# Patient Record
Sex: MALE | Race: WHITE | Employment: FULL TIME | URBAN - METROPOLITAN AREA
[De-identification: names, ages, dates, MRNs, and addresses within clinical notes are randomized per-mention and may not be internally consistent; named-entity substitution may affect disease eponyms.]

---

## 2020-12-10 ENCOUNTER — APPOINTMENT (OUTPATIENT)
Dept: GENERAL RADIOLOGY | Age: 60
End: 2020-12-10
Payer: OTHER GOVERNMENT

## 2020-12-10 ENCOUNTER — APPOINTMENT (OUTPATIENT)
Dept: CT IMAGING | Age: 60
End: 2020-12-10
Payer: OTHER GOVERNMENT

## 2020-12-10 ENCOUNTER — HOSPITAL ENCOUNTER (EMERGENCY)
Age: 60
Discharge: HOME | End: 2020-12-10
Attending: EMERGENCY MEDICINE
Payer: OTHER GOVERNMENT

## 2020-12-10 VITALS
TEMPERATURE: 98.6 F | OXYGEN SATURATION: 96 % | HEART RATE: 91 BPM | RESPIRATION RATE: 20 BRPM | WEIGHT: 188 LBS | SYSTOLIC BLOOD PRESSURE: 107 MMHG | DIASTOLIC BLOOD PRESSURE: 67 MMHG

## 2020-12-10 LAB
ALBUMIN SERPL-MCNC: 4.3 G/DL (ref 3.5–4.6)
ALP BLD-CCNC: 63 U/L (ref 35–104)
ALT SERPL-CCNC: 34 U/L (ref 0–41)
ANION GAP SERPL CALCULATED.3IONS-SCNC: 9 MEQ/L (ref 9–15)
AST SERPL-CCNC: 37 U/L (ref 0–40)
BASOPHILS ABSOLUTE: 0 K/UL (ref 0–0.2)
BASOPHILS RELATIVE PERCENT: 0.5 %
BILIRUB SERPL-MCNC: 1.2 MG/DL (ref 0.2–0.7)
BUN BLDV-MCNC: 15 MG/DL (ref 6–20)
CALCIUM SERPL-MCNC: 9.6 MG/DL (ref 8.5–9.9)
CHLORIDE BLD-SCNC: 97 MEQ/L (ref 95–107)
CO2: 30 MEQ/L (ref 20–31)
CREAT SERPL-MCNC: 0.72 MG/DL (ref 0.7–1.2)
EOSINOPHILS ABSOLUTE: 0.1 K/UL (ref 0–0.7)
EOSINOPHILS RELATIVE PERCENT: 1.8 %
GFR AFRICAN AMERICAN: >60
GFR NON-AFRICAN AMERICAN: >60
GLOBULIN: 3.1 G/DL (ref 2.3–3.5)
GLUCOSE BLD-MCNC: 97 MG/DL (ref 70–99)
HCT VFR BLD CALC: 41.2 % (ref 42–52)
HEMOGLOBIN: 14.2 G/DL (ref 14–18)
LYMPHOCYTES ABSOLUTE: 1.3 K/UL (ref 1–4.8)
LYMPHOCYTES RELATIVE PERCENT: 15.6 %
MCH RBC QN AUTO: 34.9 PG (ref 27–31.3)
MCHC RBC AUTO-ENTMCNC: 34.4 % (ref 33–37)
MCV RBC AUTO: 101.5 FL (ref 80–100)
MONOCYTES ABSOLUTE: 0.8 K/UL (ref 0.2–0.8)
MONOCYTES RELATIVE PERCENT: 10.4 %
NEUTROPHILS ABSOLUTE: 5.8 K/UL (ref 1.4–6.5)
NEUTROPHILS RELATIVE PERCENT: 71.7 %
PDW BLD-RTO: 12.9 % (ref 11.5–14.5)
PLATELET # BLD: 123 K/UL (ref 130–400)
POTASSIUM SERPL-SCNC: 4.7 MEQ/L (ref 3.4–4.9)
RBC # BLD: 4.06 M/UL (ref 4.7–6.1)
SODIUM BLD-SCNC: 136 MEQ/L (ref 135–144)
TOTAL PROTEIN: 7.4 G/DL (ref 6.3–8)
WBC # BLD: 8.1 K/UL (ref 4.8–10.8)

## 2020-12-10 PROCEDURE — 36415 COLL VENOUS BLD VENIPUNCTURE: CPT

## 2020-12-10 PROCEDURE — 85025 COMPLETE CBC W/AUTO DIFF WBC: CPT

## 2020-12-10 PROCEDURE — 71046 X-RAY EXAM CHEST 2 VIEWS: CPT

## 2020-12-10 PROCEDURE — 96374 THER/PROPH/DIAG INJ IV PUSH: CPT

## 2020-12-10 PROCEDURE — 74176 CT ABD & PELVIS W/O CONTRAST: CPT

## 2020-12-10 PROCEDURE — 80053 COMPREHEN METABOLIC PANEL: CPT

## 2020-12-10 PROCEDURE — 99284 EMERGENCY DEPT VISIT MOD MDM: CPT

## 2020-12-10 PROCEDURE — 6360000002 HC RX W HCPCS: Performed by: EMERGENCY MEDICINE

## 2020-12-10 PROCEDURE — 96375 TX/PRO/DX INJ NEW DRUG ADDON: CPT

## 2020-12-10 RX ORDER — 0.9 % SODIUM CHLORIDE 0.9 %
1000 INTRAVENOUS SOLUTION INTRAVENOUS ONCE
Status: DISCONTINUED | OUTPATIENT
Start: 2020-12-10 | End: 2020-12-10 | Stop reason: HOSPADM

## 2020-12-10 RX ORDER — OXYCODONE HYDROCHLORIDE AND ACETAMINOPHEN 5; 325 MG/1; MG/1
1-2 TABLET ORAL EVERY 6 HOURS PRN
Qty: 12 TABLET | Refills: 0 | Status: SHIPPED | OUTPATIENT
Start: 2020-12-10 | End: 2020-12-13

## 2020-12-10 RX ORDER — LORAZEPAM 2 MG/ML
1 INJECTION INTRAMUSCULAR ONCE
Status: COMPLETED | OUTPATIENT
Start: 2020-12-10 | End: 2020-12-10

## 2020-12-10 RX ADMIN — HYDROMORPHONE HYDROCHLORIDE 1 MG: 1 INJECTION, SOLUTION INTRAMUSCULAR; INTRAVENOUS; SUBCUTANEOUS at 04:13

## 2020-12-10 RX ADMIN — LORAZEPAM 1 MG: 2 INJECTION INTRAMUSCULAR; INTRAVENOUS at 04:13

## 2020-12-10 ASSESSMENT — ENCOUNTER SYMPTOMS
CHEST TIGHTNESS: 0
VOMITING: 0
COUGH: 0
ABDOMINAL DISTENTION: 0
WHEEZING: 0
EYE DISCHARGE: 0
BACK PAIN: 1
SORE THROAT: 0
PHOTOPHOBIA: 0
SHORTNESS OF BREATH: 0
ABDOMINAL PAIN: 0

## 2020-12-10 ASSESSMENT — PAIN DESCRIPTION - LOCATION: LOCATION: BACK;RIB CAGE

## 2020-12-10 ASSESSMENT — PAIN DESCRIPTION - PAIN TYPE: TYPE: ACUTE PAIN

## 2020-12-10 ASSESSMENT — PAIN DESCRIPTION - FREQUENCY: FREQUENCY: INTERMITTENT

## 2020-12-10 ASSESSMENT — PAIN DESCRIPTION - ORIENTATION: ORIENTATION: RIGHT

## 2020-12-10 ASSESSMENT — PAIN DESCRIPTION - DESCRIPTORS: DESCRIPTORS: SHARP

## 2020-12-10 ASSESSMENT — PAIN SCALES - GENERAL
PAINLEVEL_OUTOF10: 8
PAINLEVEL_OUTOF10: 8

## 2020-12-10 NOTE — ED NOTES
Patient is given D/C instructions along with follow up care appt. Patient is alert and orientated and verbalized understanding of all instructions. Patient ambulated out of the ER with a steady gait and no incident.      Patrice Strauss RN  12/10/20 6677

## 2020-12-10 NOTE — ED PROVIDER NOTES
[facility] ED  eMERGENCY dEPARTMENT eNCOUnter      Pt Name: Michelle Arriaga  MRN: [id number]  [place] 1960  Date of evaluation: 12/10/2020  Provider: Mary Santana MD    [address] [place] [address]       Chief Complaint   Patient presents with    Rib Pain (injury)         HISTORY OF PRESENT ILLNESS   (Location/Symptom, Timing/Onset,Context/Setting, Quality, Duration, Modifying Factors, Severity)  Note limiting factors. Michelle Arriaga is a 62 y.o. male who presents to the emergency department for evaluation of right flank pain. Patient fell 5 days ago while he was trying to move from [place] locally here to [place]. He was initially seen 2 days after the fall because of persistent pain and found to have 3 broken ribs on the right. No associated pneumothorax or other serious injuries. He brings in the paperwork from that hospital with the description of multiple rib fractures. He had baseline labs that look normal from that visit. No head injury. He presents today because despite using incentive spirometer and some limited home pain medication he is having worsening pain. He actually describes the pain to be most severe in the right flank area. No hematuria or other urinary complaints. He has been eating and drinking normally. He does not really feel short of breath but any deep inspiration causes increased discomfort on that right side. no hemoptysis    HPI    NursingNotes were reviewed. REVIEW OF SYSTEMS    (2-9 systems for level 4, 10 or more for level 5)     Review of Systems   Constitutional: Negative for chills and diaphoresis. HENT: Negative for congestion, ear pain, mouth sores and sore throat. Eyes: Negative for photophobia and discharge. Respiratory: Negative for cough, chest tightness, shortness of breath and wheezing. Cardiovascular: Negative for chest pain and palpitations. Gastrointestinal: Negative for abdominal distention, abdominal pain and vomiting.    Endocrine: of current or ex partner: None     Emotionally abused: None     Physically abused: None     Forced sexual activity: None   Other Topics Concern    None   Social History Narrative    None       SCREENINGS    Kaushik Coma Scale  Eye Opening: Spontaneous  Best Verbal Response: Oriented  Best Motor Response: Obeys commands  Kaushik Coma Scale Score: 15 @FLOW([id number])@      PHYSICAL EXAM    (up to 7 for level 4, 8 or more for level 5)     ED Triage Vitals [12/10/20 0331]   BP Temp Temp Source Pulse Resp SpO2 Height Weight   135/81 98.6 °F (37 °C) Oral 119 20 95 % -- 188 lb (85.3 kg)       Physical Exam  Vitals signs and nursing note reviewed. Constitutional:       Appearance: He is well-developed. HENT:      Head: Normocephalic. Nose: Nose normal.   Eyes:      Conjunctiva/sclera: Conjunctivae normal.      Pupils: Pupils are equal, round, and reactive to light. Neck:      Musculoskeletal: Normal range of motion and neck supple. Cardiovascular:      Rate and Rhythm: Regular rhythm. Tachycardia present. Heart sounds: Normal heart sounds. Pulmonary:      Effort: Pulmonary effort is normal.      Breath sounds: Normal breath sounds. Abdominal:      General: Bowel sounds are normal.      Palpations: Abdomen is soft. Tenderness: There is no abdominal tenderness. There is no guarding. Musculoskeletal: Normal range of motion. Arms:    Skin:     General: Skin is warm and dry. Capillary Refill: Capillary refill takes less than 2 seconds. Neurological:      Mental Status: He is alert and oriented to person, place, and time.          DIAGNOSTIC RESULTS     EKG: All EKG's are interpreted by the Emergency Department Physician who either signs or Co-signsthis chart in the absence of a cardiologist.        RADIOLOGY:   Non-plain filmimages such as CT, Ultrasound and MRI are read by the radiologist. Plain radiographic images are visualized and preliminarily interpreted by the emergency physician with the below findings:        Interpretation per the Radiologist below, if available at the time ofthis note:    CT ABDOMEN PELVIS WO CONTRAST Additional Contrast? None    (Results Pending)   XR CHEST (2 VW)    (Results Pending)         ED BEDSIDE ULTRASOUND:   Performed by ED Physician - none    LABS:  Labs Reviewed   CBC WITH AUTO DIFFERENTIAL - Abnormal; Notable for the following components:       Result Value    RBC 4.06 (*)     Hematocrit 41.2 (*)     .5 (*)     MCH 34.9 (*)     Platelets 676 (*)     All other components within normal limits   COMPREHENSIVE METABOLIC PANEL - Abnormal; Notable for the following components: Total Bilirubin 1.2 (*)     All other components within normal limits       All other labs were within normal range or not returned as of this dictation. EMERGENCY DEPARTMENT COURSE and DIFFERENTIAL DIAGNOSIS/MDM:   Vitals:    Vitals:    12/10/20 0331 12/10/20 0415 12/10/20 0500   BP: 135/81 (!) 97/59 107/67   Pulse: 119 91    Resp: 20     Temp: 98.6 °F (37 °C)     TempSrc: Oral     SpO2: 95% 96%    Weight: 188 lb (85.3 kg)          MDM patient had a CT abdomen pelvis today I was concerned based on the location of his flank pain that there was potential kidney injury or retroperitoneal hematoma. CT scan does demonstrate ribs 10/11/2012 fractured as his previous paperwork he has with him. There is no signs of significant hematoma or internal injury. His baseline hemoglobin is normal.  Once his pain was controlled his heart rate easily came down below 100. He has been maintaining good saturations. I will write for some pain medication to use based on his multiple rib fractures and give him a local physician to follow-up with since he is recently moved to the area. CONSULTS:  None    PROCEDURES:  Unless otherwise noted below, none     Procedures    FINAL IMPRESSION      1.  Closed fracture of multiple ribs of right side, initial encounter          DISPOSITION/PLAN DISPOSITION Discharge - Pending Orders Complete 12/10/2020 05:47:35 AM      PATIENT REFERRED TO:  Patricia Melgoza MD  [address] [address]  [phone]            DISCHARGE MEDICATIONS:  New Prescriptions    OXYCODONE-ACETAMINOPHEN (PERCOCET) 5-325 MG PER TABLET    Take 1-2 tablets by mouth every 6 hours as needed for Pain for up to 3 days. WARNING:  May cause drowsiness. May impair ability to operate vehicles or machinery. Do not use in combination with alcohol.           (Please note that portions of this note were completed with a voice recognition program.  Efforts were made to edit the dictations but occasionally words are mis-transcribed.)    Lauren Montana MD (electronically signed)  Attending Emergency Physician          Lauren Montana MD  12/10/20 9508

## 2020-12-10 NOTE — ED TRIAGE NOTES
Pt presents from home with c/o rib, back pain. Pt dx with 3 rib fx to R side x2 days ago from fall. Pt a&o x4. resp even. ls clear. Skin p/w/d. Pt reports worsening pain to R ribcage, R lower back upon coughing, sneezing. Pt reports self admin pain med approx 2pm with minimal relief.

## 2023-09-25 ENCOUNTER — APPOINTMENT (OUTPATIENT)
Dept: PRIMARY CARE | Facility: CLINIC | Age: 63
End: 2023-09-25
Payer: OTHER GOVERNMENT

## 2023-09-29 ENCOUNTER — OFFICE VISIT (OUTPATIENT)
Dept: PRIMARY CARE | Facility: CLINIC | Age: 63
End: 2023-09-29
Payer: OTHER GOVERNMENT

## 2023-09-29 VITALS
OXYGEN SATURATION: 98 % | SYSTOLIC BLOOD PRESSURE: 138 MMHG | HEART RATE: 108 BPM | RESPIRATION RATE: 18 BRPM | DIASTOLIC BLOOD PRESSURE: 81 MMHG | BODY MASS INDEX: 32.88 KG/M2 | TEMPERATURE: 97 F | HEIGHT: 66 IN | WEIGHT: 204.6 LBS

## 2023-09-29 DIAGNOSIS — Z86.19 HISTORY OF JOCK ITCH: ICD-10-CM

## 2023-09-29 DIAGNOSIS — M24.849 LOCKING FINGER JOINT: ICD-10-CM

## 2023-09-29 DIAGNOSIS — G47.62 NOCTURNAL LEG CRAMPS: ICD-10-CM

## 2023-09-29 DIAGNOSIS — Z12.5 SCREENING FOR PROSTATE CANCER: ICD-10-CM

## 2023-09-29 DIAGNOSIS — I10 HYPERTENSION, UNSPECIFIED TYPE: Primary | ICD-10-CM

## 2023-09-29 DIAGNOSIS — K42.9 UMBILICAL HERNIA WITHOUT OBSTRUCTION AND WITHOUT GANGRENE: ICD-10-CM

## 2023-09-29 PROCEDURE — 99204 OFFICE O/P NEW MOD 45 MIN: CPT | Performed by: FAMILY MEDICINE

## 2023-09-29 PROCEDURE — 3075F SYST BP GE 130 - 139MM HG: CPT | Performed by: FAMILY MEDICINE

## 2023-09-29 PROCEDURE — 3079F DIAST BP 80-89 MM HG: CPT | Performed by: FAMILY MEDICINE

## 2023-09-29 PROCEDURE — 1036F TOBACCO NON-USER: CPT | Performed by: FAMILY MEDICINE

## 2023-09-29 RX ORDER — LISINOPRIL 20 MG/1
20 TABLET ORAL DAILY
Qty: 90 TABLET | Refills: 3 | Status: SHIPPED | OUTPATIENT
Start: 2023-09-29

## 2023-09-29 RX ORDER — CLOTRIMAZOLE 1 %
CREAM (GRAM) TOPICAL 2 TIMES DAILY PRN
Qty: 60 G | Refills: 2 | Status: SHIPPED
Start: 2023-09-29 | End: 2023-09-29 | Stop reason: SDUPTHER

## 2023-09-29 RX ORDER — LISINOPRIL 20 MG/1
20 TABLET ORAL DAILY
Qty: 90 TABLET | Refills: 3 | Status: SHIPPED
Start: 2023-09-29 | End: 2023-09-29 | Stop reason: SDUPTHER

## 2023-09-29 RX ORDER — CLOTRIMAZOLE 1 %
CREAM (GRAM) TOPICAL 2 TIMES DAILY PRN
Qty: 60 G | Refills: 2 | Status: SHIPPED | OUTPATIENT
Start: 2023-09-29 | End: 2024-01-29 | Stop reason: HOSPADM

## 2023-09-29 ASSESSMENT — ENCOUNTER SYMPTOMS
MYALGIAS: 1
COUGH: 0
FEVER: 0
FATIGUE: 0
NUMBNESS: 0
ABDOMINAL PAIN: 0
WHEEZING: 0
HYPERTENSION: 1

## 2023-09-29 NOTE — PROGRESS NOTES
`Subjective   Patient ID: Satinder Silver is a 62 y.o. male who presents for Establish Care (Hypertension currently taking Veveojoni's lisinopril. Not on anything of his own ).    Hypertension  The current episode started more than 1 year ago. The problem has been gradually improving since onset. Pertinent negatives include no chest pain.        New patient to this office  He has a history of hypertension which was diagnosed several years ago.  He was previously taking lisinopril 20 mg daily  He had run out of this medication, and more recently has been taking his fiancés lisinopril 20 mg daily.  He checks his blood pressure out of the office periodically and it is typically less than 140/90  He has not been exercising on a regular basis.  He has a history of recurrent jock itch.  He was previously on a cream (he believes it may have been clotrimazole) which he had applied as needed and worked well  He has noticed that his thumb and index finger of his left hand have been locking up intermittently over the last 6 months.  He does have some tenderness in the thumb joint and second MCP joint.  They will typically lock together  He has had nocturnal leg cramps involving both legs over the last several months.  This involves the muscles.  Does not occur during the day and does not get pain with ambulation.  No numbness or tingling.  This is overall unchanged.  This occurs most nights  He has had an umbilical hernia present for approximately 6 weeks.  No pain  He is otherwise feeling well.  No significant fatigue.  No chest pain, shortness of breath, cough or wheezing.  He has been trying to watch salt in his diet  His wife passed away several years ago.  He is planning on getting  to his fiancée and 2025  He reports that his last colonoscopy was approximately 2021.  He gets a colonoscopy every 5 years  He does not smoke  Otherwise no concerns today    I did review his recent [facility] screening from 9/18/2023  AAA  "screening: This is normal  Carotid artery screening: Normal bilaterally  Glucose: 91  Lipid panel showed total cholesterol 134, HDL 51, LDL 73, triglycerides 51  PAD screening was normal bilaterally      Review of Systems   Constitutional:  Negative for fatigue and fever.   Respiratory:  Negative for cough and wheezing.    Cardiovascular:  Negative for chest pain and leg swelling.   Gastrointestinal:  Negative for abdominal pain.   Musculoskeletal:  Positive for myalgias.   Neurological:  Negative for numbness.       Objective   /81   Pulse 108   Temp 36.1 °C (97 °F)   Resp 18   Ht 1.676 m (5' 6\")   Wt 92.8 kg (204 lb 9.6 oz)   SpO2 98%   BMI 33.02 kg/m²     Physical Exam  Vitals reviewed.   Constitutional:       General: He is not in acute distress.     Appearance: Normal appearance. He is well-developed.   HENT:      Head: Normocephalic.   Eyes:      Conjunctiva/sclera: Conjunctivae normal.   Cardiovascular:      Rate and Rhythm: Normal rate and regular rhythm.      Heart sounds: Normal heart sounds.   Pulmonary:      Effort: Pulmonary effort is normal.      Breath sounds: Normal breath sounds.   Abdominal:      General: There is no distension.      Palpations: Abdomen is soft.      Hernia: A hernia is present.      Comments: There is a small umbilical hernia measuring approximately 2 cm.  This is soft and reducible.  Nontender   Musculoskeletal:      Right lower leg: No edema.      Left lower leg: No edema.      Comments: Pedal pulses are intact  Left hand: There is slight tenderness at the second and first MCP joints.  There is no swelling   Skin:     Findings: No rash.   Neurological:      Mental Status: He is alert.   Psychiatric:         Mood and Affect: Mood normal.         Behavior: Behavior normal.         Assessment/Plan   Problem List Items Addressed This Visit          Medium    Hypertension - Primary    Relevant Medications    lisinopril 20 mg tablet    Other Relevant Orders    CBC    " Comprehensive Metabolic Panel    Vitamin B12    TSH with reflex to Free T4 if abnormal    Magnesium     Other Visit Diagnoses       History of jock itch        Relevant Medications    clotrimazole (Lotrimin) 1 % cream    Umbilical hernia without obstruction and without gangrene        Locking finger joint        Relevant Orders    Referral to Orthopaedic Surgery    Nocturnal leg cramps        Screening for prostate cancer        Relevant Orders    Prostate Specific Antigen, Screen          #1 hypertension: His blood pressure today is 138/81.  We will continue lisinopril at 20 mg daily.  Follow-up in 3 months for recheck and CPE.  He will get us a copy of his most recent screening colonoscopy from approximately 2 years ago  2.  Recurrent jock itch: Given refill on clotrimazole cream to apply as needed  3.  Umbilical hernia: This is soft and reducible, and has not been bothering him much.  We discussed referring him to a surgeon versus monitoring and he would prefer to monitor for now.  If this does become larger or more bothersome, he can call and we can discuss a general surgery referral.  We discussed the importance of going to the ER immediately if he develops any signs of incarceration or strangulation involving hernia  4.  Recurrent leg cramps: We discussed hydration, stretching, and discussed that he could try a trial of B complex vitamins, or possibly Benadryl as needed to help with symptoms.  We will check labs including electrolytes and recheck at follow-up  5.  Left hand pain/locking: He reports a several month history of his second and first fingers locking together.  We will refer him to orthopedics to discuss further  Follow-up in 3 months for recheck and CPE

## 2024-01-25 ENCOUNTER — APPOINTMENT (OUTPATIENT)
Dept: GASTROENTEROLOGY | Facility: HOSPITAL | Age: 64
DRG: 432 | End: 2024-01-25
Payer: OTHER GOVERNMENT

## 2024-01-25 ENCOUNTER — APPOINTMENT (OUTPATIENT)
Dept: RADIOLOGY | Facility: HOSPITAL | Age: 64
DRG: 432 | End: 2024-01-25
Payer: OTHER GOVERNMENT

## 2024-01-25 ENCOUNTER — HOSPITAL ENCOUNTER (INPATIENT)
Facility: HOSPITAL | Age: 64
LOS: 4 days | Discharge: HOME | DRG: 432 | End: 2024-01-29
Attending: STUDENT IN AN ORGANIZED HEALTH CARE EDUCATION/TRAINING PROGRAM | Admitting: INTERNAL MEDICINE
Payer: OTHER GOVERNMENT

## 2024-01-25 ENCOUNTER — ANESTHESIA EVENT (OUTPATIENT)
Dept: RADIOLOGY | Facility: HOSPITAL | Age: 64
DRG: 432 | End: 2024-01-25
Payer: OTHER GOVERNMENT

## 2024-01-25 ENCOUNTER — ANESTHESIA (OUTPATIENT)
Dept: RADIOLOGY | Facility: HOSPITAL | Age: 64
DRG: 432 | End: 2024-01-25
Payer: OTHER GOVERNMENT

## 2024-01-25 DIAGNOSIS — E83.42 HYPOMAGNESEMIA: ICD-10-CM

## 2024-01-25 DIAGNOSIS — K92.0 GASTROINTESTINAL HEMORRHAGE WITH HEMATEMESIS: Primary | ICD-10-CM

## 2024-01-25 DIAGNOSIS — I95.9 HYPOTENSION, UNSPECIFIED HYPOTENSION TYPE: ICD-10-CM

## 2024-01-25 DIAGNOSIS — R00.0 TACHYCARDIA: ICD-10-CM

## 2024-01-25 DIAGNOSIS — I85.11 ESOPHAGEAL VARICES WITH BLEEDING IN DISEASES CLASSIFIED ELSEWHERE (MULTI): ICD-10-CM

## 2024-01-25 DIAGNOSIS — D50.0 BLOOD LOSS ANEMIA: ICD-10-CM

## 2024-01-25 DIAGNOSIS — K59.00 CONSTIPATION, UNSPECIFIED CONSTIPATION TYPE: ICD-10-CM

## 2024-01-25 LAB
ABO GROUP (TYPE) IN BLOOD: NORMAL
ABO GROUP (TYPE) IN BLOOD: NORMAL
ALBUMIN SERPL BCP-MCNC: 3.1 G/DL (ref 3.4–5)
ALP SERPL-CCNC: 93 U/L (ref 33–136)
ALT SERPL W P-5'-P-CCNC: 13 U/L (ref 10–52)
ANION GAP BLDV CALCULATED.4IONS-SCNC: 10 MMOL/L (ref 10–25)
ANION GAP BLDV CALCULATED.4IONS-SCNC: 12 MMOL/L (ref 10–25)
ANION GAP SERPL CALC-SCNC: 11 MMOL/L (ref 10–20)
ANTIBODY SCREEN: NORMAL
APPARATUS: ABNORMAL
APPEARANCE UR: CLEAR
APTT PPP: 30 SECONDS (ref 27–38)
AST SERPL W P-5'-P-CCNC: 30 U/L (ref 9–39)
BASE EXCESS BLDV CALC-SCNC: -2.9 MMOL/L (ref -2–3)
BASE EXCESS BLDV CALC-SCNC: -6.4 MMOL/L (ref -2–3)
BASOPHILS # BLD AUTO: 0.02 X10*3/UL (ref 0–0.1)
BASOPHILS NFR BLD AUTO: 0.1 %
BILIRUB DIRECT SERPL-MCNC: 0.8 MG/DL (ref 0–0.3)
BILIRUB SERPL-MCNC: 1.6 MG/DL (ref 0–1.2)
BILIRUB UR STRIP.AUTO-MCNC: NEGATIVE MG/DL
BLOOD EXPIRATION DATE: NORMAL
BODY TEMPERATURE: ABNORMAL
BODY TEMPERATURE: ABNORMAL
BUN SERPL-MCNC: 39 MG/DL (ref 6–23)
CA-I BLDV-SCNC: 1.16 MMOL/L (ref 1.1–1.33)
CA-I BLDV-SCNC: 1.28 MMOL/L (ref 1.1–1.33)
CALCIUM SERPL-MCNC: 8.4 MG/DL (ref 8.6–10.3)
CHLORIDE BLDV-SCNC: 109 MMOL/L (ref 98–107)
CHLORIDE BLDV-SCNC: 110 MMOL/L (ref 98–107)
CHLORIDE SERPL-SCNC: 106 MMOL/L (ref 98–107)
CO2 SERPL-SCNC: 24 MMOL/L (ref 21–32)
COLOR UR: ABNORMAL
CREAT SERPL-MCNC: 0.98 MG/DL (ref 0.5–1.3)
CRITICAL CALL TIME: 1526
CRITICAL CALLED BY: ABNORMAL
CRITICAL CALLED TO: ABNORMAL
CRITICAL READ BACK: ABNORMAL
DACRYOCYTES BLD QL SMEAR: NORMAL
DISPENSE STATUS: NORMAL
EGFRCR SERPLBLD CKD-EPI 2021: 87 ML/MIN/1.73M*2
EOSINOPHIL # BLD AUTO: 0.04 X10*3/UL (ref 0–0.7)
EOSINOPHIL NFR BLD AUTO: 0.3 %
ERYTHROCYTE [DISTWIDTH] IN BLOOD BY AUTOMATED COUNT: 21.5 % (ref 11.5–14.5)
GLUCOSE BLDV-MCNC: 111 MG/DL (ref 74–99)
GLUCOSE BLDV-MCNC: 86 MG/DL (ref 74–99)
GLUCOSE SERPL-MCNC: 120 MG/DL (ref 74–99)
GLUCOSE UR STRIP.AUTO-MCNC: NEGATIVE MG/DL
HCO3 BLDV-SCNC: 20.2 MMOL/L (ref 22–26)
HCO3 BLDV-SCNC: 21.2 MMOL/L (ref 22–26)
HCT VFR BLD AUTO: 15.8 % (ref 41–52)
HCT VFR BLD AUTO: 22.9 % (ref 41–52)
HCT VFR BLD EST: 17 % (ref 41–52)
HCT VFR BLD EST: 22 % (ref 41–52)
HGB BLD-MCNC: 4.1 G/DL (ref 13.5–17.5)
HGB BLD-MCNC: 6.9 G/DL (ref 13.5–17.5)
HGB BLDV-MCNC: 5.7 G/DL (ref 13.5–17.5)
HGB BLDV-MCNC: 7.4 G/DL (ref 13.5–17.5)
IMM GRANULOCYTES # BLD AUTO: 0.1 X10*3/UL (ref 0–0.7)
IMM GRANULOCYTES NFR BLD AUTO: 0.7 % (ref 0–0.9)
INHALED O2 CONCENTRATION: 21 %
INHALED O2 CONCENTRATION: 28 %
INR PPP: 1.6 (ref 0.9–1.1)
KETONES UR STRIP.AUTO-MCNC: NEGATIVE MG/DL
LACTATE BLDV-SCNC: 1.4 MMOL/L (ref 0.4–2)
LACTATE BLDV-SCNC: 2.5 MMOL/L (ref 0.4–2)
LACTATE SERPL-SCNC: 1.5 MMOL/L (ref 0.4–2)
LEUKOCYTE ESTERASE UR QL STRIP.AUTO: ABNORMAL
LIPASE SERPL-CCNC: 34 U/L (ref 9–82)
LYMPHOCYTES # BLD AUTO: 1.21 X10*3/UL (ref 1.2–4.8)
LYMPHOCYTES NFR BLD AUTO: 8.2 %
MCH RBC QN AUTO: 20.9 PG (ref 26–34)
MCHC RBC AUTO-ENTMCNC: 25.9 G/DL (ref 32–36)
MCV RBC AUTO: 81 FL (ref 80–100)
MONOCYTES # BLD AUTO: 0.95 X10*3/UL (ref 0.1–1)
MONOCYTES NFR BLD AUTO: 6.5 %
NEUTROPHILS # BLD AUTO: 12.35 X10*3/UL (ref 1.2–7.7)
NEUTROPHILS NFR BLD AUTO: 84.2 %
NITRITE UR QL STRIP.AUTO: NEGATIVE
NRBC BLD-RTO: 0 /100 WBCS (ref 0–0)
OVALOCYTES BLD QL SMEAR: NORMAL
OXYHGB MFR BLDV: 69.3 % (ref 45–75)
OXYHGB MFR BLDV: 96.7 % (ref 45–75)
PCO2 BLDV: 32 MM HG (ref 41–51)
PCO2 BLDV: 45 MM HG (ref 41–51)
PH BLDV: 7.26 PH (ref 7.33–7.43)
PH BLDV: 7.43 PH (ref 7.33–7.43)
PH UR STRIP.AUTO: 6 [PH]
PLATELET # BLD AUTO: 103 X10*3/UL (ref 150–450)
PO2 BLDV: 215 MM HG (ref 35–45)
PO2 BLDV: 41 MM HG (ref 35–45)
POLYCHROMASIA BLD QL SMEAR: NORMAL
POTASSIUM BLDV-SCNC: 4.6 MMOL/L (ref 3.5–5.3)
POTASSIUM BLDV-SCNC: 5 MMOL/L (ref 3.5–5.3)
POTASSIUM SERPL-SCNC: 3.8 MMOL/L (ref 3.5–5.3)
PRODUCT BLOOD TYPE: 6200
PRODUCT BLOOD TYPE: 9500
PRODUCT BLOOD TYPE: 9500
PRODUCT CODE: NORMAL
PROT SERPL-MCNC: 6.2 G/DL (ref 6.4–8.2)
PROT UR STRIP.AUTO-MCNC: NEGATIVE MG/DL
PROTHROMBIN TIME: 18.4 SECONDS (ref 9.8–12.8)
RBC # BLD AUTO: 1.96 X10*6/UL (ref 4.5–5.9)
RBC # UR STRIP.AUTO: NEGATIVE /UL
RBC #/AREA URNS AUTO: ABNORMAL /HPF
RBC MORPH BLD: NORMAL
RH FACTOR (ANTIGEN D): NORMAL
RH FACTOR (ANTIGEN D): NORMAL
SAO2 % BLDV: 100 % (ref 45–75)
SAO2 % BLDV: 71 % (ref 45–75)
SODIUM BLDV-SCNC: 135 MMOL/L (ref 136–145)
SODIUM BLDV-SCNC: 138 MMOL/L (ref 136–145)
SODIUM SERPL-SCNC: 137 MMOL/L (ref 136–145)
SP GR UR STRIP.AUTO: 1.05
UNIT ABO: NORMAL
UNIT NUMBER: NORMAL
UNIT RH: NORMAL
UNIT VOLUME: 350
UROBILINOGEN UR STRIP.AUTO-MCNC: 4 MG/DL
WBC # BLD AUTO: 14.7 X10*3/UL (ref 4.4–11.3)
WBC #/AREA URNS AUTO: ABNORMAL /HPF
XM INTEP: NORMAL

## 2024-01-25 PROCEDURE — C9113 INJ PANTOPRAZOLE SODIUM, VIA: HCPCS

## 2024-01-25 PROCEDURE — 3700000002 HC GENERAL ANESTHESIA TIME - EACH INCREMENTAL 1 MINUTE

## 2024-01-25 PROCEDURE — A72191: Performed by: NURSE ANESTHETIST, CERTIFIED REGISTERED

## 2024-01-25 PROCEDURE — 74174 CTA ABD&PLVS W/CONTRAST: CPT

## 2024-01-25 PROCEDURE — 85025 COMPLETE CBC W/AUTO DIFF WBC: CPT

## 2024-01-25 PROCEDURE — 36430 TRANSFUSION BLD/BLD COMPNT: CPT

## 2024-01-25 PROCEDURE — 36415 COLL VENOUS BLD VENIPUNCTURE: CPT | Performed by: STUDENT IN AN ORGANIZED HEALTH CARE EDUCATION/TRAINING PROGRAM

## 2024-01-25 PROCEDURE — 2500000004 HC RX 250 GENERAL PHARMACY W/ HCPCS (ALT 636 FOR OP/ED)

## 2024-01-25 PROCEDURE — 2500000001 HC RX 250 WO HCPCS SELF ADMINISTERED DRUGS (ALT 637 FOR MEDICARE OP): Performed by: STUDENT IN AN ORGANIZED HEALTH CARE EDUCATION/TRAINING PROGRAM

## 2024-01-25 PROCEDURE — 2720000007 HC OR 272 NO HCPCS

## 2024-01-25 PROCEDURE — 99285 EMERGENCY DEPT VISIT HI MDM: CPT | Mod: 27 | Performed by: STUDENT IN AN ORGANIZED HEALTH CARE EDUCATION/TRAINING PROGRAM

## 2024-01-25 PROCEDURE — A72191: Performed by: STUDENT IN AN ORGANIZED HEALTH CARE EDUCATION/TRAINING PROGRAM

## 2024-01-25 PROCEDURE — 86901 BLOOD TYPING SEROLOGIC RH(D): CPT

## 2024-01-25 PROCEDURE — 2500000004 HC RX 250 GENERAL PHARMACY W/ HCPCS (ALT 636 FOR OP/ED): Performed by: NURSE ANESTHETIST, CERTIFIED REGISTERED

## 2024-01-25 PROCEDURE — P9016 RBC LEUKOCYTES REDUCED: HCPCS

## 2024-01-25 PROCEDURE — 96374 THER/PROPH/DIAG INJ IV PUSH: CPT | Mod: 59

## 2024-01-25 PROCEDURE — 99291 CRITICAL CARE FIRST HOUR: CPT

## 2024-01-25 PROCEDURE — 84132 ASSAY OF SERUM POTASSIUM: CPT | Performed by: NURSE PRACTITIONER

## 2024-01-25 PROCEDURE — 81001 URINALYSIS AUTO W/SCOPE: CPT

## 2024-01-25 PROCEDURE — 2550000001 HC RX 255 CONTRASTS: Performed by: STUDENT IN AN ORGANIZED HEALTH CARE EDUCATION/TRAINING PROGRAM

## 2024-01-25 PROCEDURE — 96365 THER/PROPH/DIAG IV INF INIT: CPT

## 2024-01-25 PROCEDURE — 83690 ASSAY OF LIPASE: CPT

## 2024-01-25 PROCEDURE — 83605 ASSAY OF LACTIC ACID: CPT | Performed by: STUDENT IN AN ORGANIZED HEALTH CARE EDUCATION/TRAINING PROGRAM

## 2024-01-25 PROCEDURE — 06L38CZ OCCLUSION OF ESOPHAGEAL VEIN WITH EXTRALUMINAL DEVICE, VIA NATURAL OR ARTIFICIAL OPENING ENDOSCOPIC: ICD-10-PCS | Performed by: STUDENT IN AN ORGANIZED HEALTH CARE EDUCATION/TRAINING PROGRAM

## 2024-01-25 PROCEDURE — 99223 1ST HOSP IP/OBS HIGH 75: CPT | Performed by: STUDENT IN AN ORGANIZED HEALTH CARE EDUCATION/TRAINING PROGRAM

## 2024-01-25 PROCEDURE — 96361 HYDRATE IV INFUSION ADD-ON: CPT

## 2024-01-25 PROCEDURE — 43244 EGD VARICES LIGATION: CPT

## 2024-01-25 PROCEDURE — 96375 TX/PRO/DX INJ NEW DRUG ADDON: CPT

## 2024-01-25 PROCEDURE — 3700000001 HC GENERAL ANESTHESIA TIME - INITIAL BASE CHARGE

## 2024-01-25 PROCEDURE — 85730 THROMBOPLASTIN TIME PARTIAL: CPT

## 2024-01-25 PROCEDURE — 74174 CTA ABD&PLVS W/CONTRAST: CPT | Performed by: RADIOLOGY

## 2024-01-25 PROCEDURE — 87086 URINE CULTURE/COLONY COUNT: CPT | Mod: STJLAB

## 2024-01-25 PROCEDURE — 2020000001 HC ICU ROOM DAILY

## 2024-01-25 PROCEDURE — 84075 ASSAY ALKALINE PHOSPHATASE: CPT

## 2024-01-25 PROCEDURE — 86920 COMPATIBILITY TEST SPIN: CPT

## 2024-01-25 PROCEDURE — 2500000004 HC RX 250 GENERAL PHARMACY W/ HCPCS (ALT 636 FOR OP/ED): Performed by: STUDENT IN AN ORGANIZED HEALTH CARE EDUCATION/TRAINING PROGRAM

## 2024-01-25 PROCEDURE — 84132 ASSAY OF SERUM POTASSIUM: CPT | Performed by: NURSE ANESTHETIST, CERTIFIED REGISTERED

## 2024-01-25 PROCEDURE — 2500000005 HC RX 250 GENERAL PHARMACY W/O HCPCS: Performed by: NURSE ANESTHETIST, CERTIFIED REGISTERED

## 2024-01-25 PROCEDURE — 36415 COLL VENOUS BLD VENIPUNCTURE: CPT

## 2024-01-25 PROCEDURE — 82248 BILIRUBIN DIRECT: CPT

## 2024-01-25 PROCEDURE — 99140 ANES COMP EMERGENCY COND: CPT | Performed by: STUDENT IN AN ORGANIZED HEALTH CARE EDUCATION/TRAINING PROGRAM

## 2024-01-25 PROCEDURE — C9113 INJ PANTOPRAZOLE SODIUM, VIA: HCPCS | Performed by: STUDENT IN AN ORGANIZED HEALTH CARE EDUCATION/TRAINING PROGRAM

## 2024-01-25 PROCEDURE — A4216 STERILE WATER/SALINE, 10 ML: HCPCS

## 2024-01-25 PROCEDURE — 43244 EGD VARICES LIGATION: CPT | Performed by: STUDENT IN AN ORGANIZED HEALTH CARE EDUCATION/TRAINING PROGRAM

## 2024-01-25 PROCEDURE — 85610 PROTHROMBIN TIME: CPT

## 2024-01-25 PROCEDURE — 85014 HEMATOCRIT: CPT

## 2024-01-25 RX ORDER — OCTREOTIDE ACETATE 100 UG/ML
50 INJECTION, SOLUTION INTRAVENOUS; SUBCUTANEOUS ONCE
Status: COMPLETED | OUTPATIENT
Start: 2024-01-25 | End: 2024-01-25

## 2024-01-25 RX ORDER — ONDANSETRON HYDROCHLORIDE 2 MG/ML
4 INJECTION, SOLUTION INTRAVENOUS ONCE
Status: COMPLETED | OUTPATIENT
Start: 2024-01-25 | End: 2024-01-25

## 2024-01-25 RX ORDER — ONDANSETRON HYDROCHLORIDE 2 MG/ML
INJECTION, SOLUTION INTRAVENOUS AS NEEDED
Status: DISCONTINUED | OUTPATIENT
Start: 2024-01-25 | End: 2024-01-25

## 2024-01-25 RX ORDER — OCTREOTIDE ACETATE 50 UG/ML
50 INJECTION, SOLUTION INTRAVENOUS; SUBCUTANEOUS ONCE
Status: DISCONTINUED | OUTPATIENT
Start: 2024-01-25 | End: 2024-01-25

## 2024-01-25 RX ORDER — ROCURONIUM BROMIDE 50 MG/5 ML
SYRINGE (ML) INTRAVENOUS AS NEEDED
Status: DISCONTINUED | OUTPATIENT
Start: 2024-01-25 | End: 2024-01-25

## 2024-01-25 RX ORDER — SODIUM CHLORIDE, SODIUM LACTATE, POTASSIUM CHLORIDE, CALCIUM CHLORIDE 600; 310; 30; 20 MG/100ML; MG/100ML; MG/100ML; MG/100ML
INJECTION, SOLUTION INTRAVENOUS CONTINUOUS PRN
Status: DISCONTINUED | OUTPATIENT
Start: 2024-01-25 | End: 2024-01-25

## 2024-01-25 RX ORDER — CALCIUM CHLORIDE INJECTION 100 MG/ML
INJECTION, SOLUTION INTRAVENOUS AS NEEDED
Status: DISCONTINUED | OUTPATIENT
Start: 2024-01-25 | End: 2024-01-25

## 2024-01-25 RX ORDER — SODIUM CHLORIDE 9 MG/ML
INJECTION, SOLUTION INTRAMUSCULAR; INTRAVENOUS; SUBCUTANEOUS
Status: COMPLETED
Start: 2024-01-25 | End: 2024-01-25

## 2024-01-25 RX ORDER — PANTOPRAZOLE SODIUM 40 MG/10ML
80 INJECTION, POWDER, LYOPHILIZED, FOR SOLUTION INTRAVENOUS ONCE
Status: COMPLETED | OUTPATIENT
Start: 2024-01-25 | End: 2024-01-25

## 2024-01-25 RX ORDER — PANTOPRAZOLE SODIUM 40 MG/10ML
40 INJECTION, POWDER, LYOPHILIZED, FOR SOLUTION INTRAVENOUS 2 TIMES DAILY
Status: DISCONTINUED | OUTPATIENT
Start: 2024-01-25 | End: 2024-01-29 | Stop reason: HOSPADM

## 2024-01-25 RX ORDER — ETOMIDATE 2 MG/ML
INJECTION INTRAVENOUS AS NEEDED
Status: DISCONTINUED | OUTPATIENT
Start: 2024-01-25 | End: 2024-01-25

## 2024-01-25 RX ORDER — CEFTRIAXONE 1 G/50ML
1 INJECTION, SOLUTION INTRAVENOUS EVERY 24 HOURS
Status: DISCONTINUED | OUTPATIENT
Start: 2024-01-26 | End: 2024-01-29 | Stop reason: HOSPADM

## 2024-01-25 RX ORDER — DEXTROMETHORPHAN/PSEUDOEPHED 2.5-7.5/.8
DROPS ORAL AS NEEDED
Status: COMPLETED | OUTPATIENT
Start: 2024-01-25 | End: 2024-01-25

## 2024-01-25 RX ORDER — FENTANYL CITRATE 50 UG/ML
INJECTION, SOLUTION INTRAMUSCULAR; INTRAVENOUS AS NEEDED
Status: DISCONTINUED | OUTPATIENT
Start: 2024-01-25 | End: 2024-01-25

## 2024-01-25 RX ORDER — LIDOCAINE HYDROCHLORIDE 20 MG/ML
INJECTION, SOLUTION INFILTRATION; PERINEURAL AS NEEDED
Status: DISCONTINUED | OUTPATIENT
Start: 2024-01-25 | End: 2024-01-25

## 2024-01-25 RX ORDER — CEFTRIAXONE 1 G/50ML
1 INJECTION, SOLUTION INTRAVENOUS ONCE
Status: COMPLETED | OUTPATIENT
Start: 2024-01-25 | End: 2024-01-25

## 2024-01-25 RX ORDER — SUCCINYLCHOLINE CHLORIDE 100 MG/5ML
SYRINGE (ML) INTRAVENOUS AS NEEDED
Status: DISCONTINUED | OUTPATIENT
Start: 2024-01-25 | End: 2024-01-25

## 2024-01-25 RX ADMIN — SODIUM CHLORIDE 10 ML: 9 INJECTION INTRAMUSCULAR; INTRAVENOUS; SUBCUTANEOUS at 21:18

## 2024-01-25 RX ADMIN — SUGAMMADEX 200 MG: 100 INJECTION, SOLUTION INTRAVENOUS at 16:25

## 2024-01-25 RX ADMIN — PANTOPRAZOLE SODIUM 80 MG: 40 INJECTION, POWDER, FOR SOLUTION INTRAVENOUS at 12:25

## 2024-01-25 RX ADMIN — PANTOPRAZOLE SODIUM 40 MG: 40 INJECTION, POWDER, FOR SOLUTION INTRAVENOUS at 21:17

## 2024-01-25 RX ADMIN — ONDANSETRON 4 MG: 2 INJECTION INTRAMUSCULAR; INTRAVENOUS at 16:19

## 2024-01-25 RX ADMIN — CEFTRIAXONE SODIUM 1 G: 1 INJECTION, SOLUTION INTRAVENOUS at 12:25

## 2024-01-25 RX ADMIN — IOHEXOL 90 ML: 350 INJECTION, SOLUTION INTRAVENOUS at 14:15

## 2024-01-25 RX ADMIN — SODIUM CHLORIDE 1000 ML: 9 INJECTION, SOLUTION INTRAVENOUS at 12:24

## 2024-01-25 RX ADMIN — Medication 20 MG: at 16:10

## 2024-01-25 RX ADMIN — LIDOCAINE HYDROCHLORIDE 100 ML: 20 INJECTION, SOLUTION INFILTRATION; PERINEURAL at 15:51

## 2024-01-25 RX ADMIN — SUGAMMADEX 200 MG: 100 INJECTION, SOLUTION INTRAVENOUS at 16:29

## 2024-01-25 RX ADMIN — OCTREOTIDE ACETATE 50 MCG: 100 INJECTION, SOLUTION INTRAVENOUS; SUBCUTANEOUS at 13:13

## 2024-01-25 RX ADMIN — OCTREOTIDE ACETATE 25 MCG/HR: 500 INJECTION, SOLUTION INTRAVENOUS; SUBCUTANEOUS at 13:14

## 2024-01-25 RX ADMIN — Medication 100 MG: at 15:51

## 2024-01-25 RX ADMIN — SIMETHICONE 300 MG: 20 EMULSION ORAL at 16:08

## 2024-01-25 RX ADMIN — ONDANSETRON 4 MG: 2 INJECTION INTRAMUSCULAR; INTRAVENOUS at 12:25

## 2024-01-25 RX ADMIN — ETOMIDATE 20 MG: 20 INJECTION, SOLUTION INTRAVENOUS at 15:51

## 2024-01-25 RX ADMIN — CALCIUM CHLORIDE 0.25 G: 100 INJECTION INTRAVENOUS; INTRAVENTRICULAR at 15:56

## 2024-01-25 RX ADMIN — SODIUM CHLORIDE, POTASSIUM CHLORIDE, SODIUM LACTATE AND CALCIUM CHLORIDE: 600; 310; 30; 20 INJECTION, SOLUTION INTRAVENOUS at 15:48

## 2024-01-25 RX ADMIN — FENTANYL CITRATE 100 MCG: 50 INJECTION, SOLUTION INTRAMUSCULAR; INTRAVENOUS at 15:57

## 2024-01-25 RX ADMIN — CALCIUM CHLORIDE 0.25 G: 100 INJECTION INTRAVENOUS; INTRAVENTRICULAR at 16:01

## 2024-01-25 SDOH — SOCIAL STABILITY: SOCIAL INSECURITY: ABUSE: ADULT

## 2024-01-25 SDOH — SOCIAL STABILITY: SOCIAL INSECURITY: ARE THERE ANY APPARENT SIGNS OF INJURIES/BEHAVIORS THAT COULD BE RELATED TO ABUSE/NEGLECT?: NO

## 2024-01-25 SDOH — SOCIAL STABILITY: SOCIAL INSECURITY: WERE YOU ABLE TO COMPLETE ALL THE BEHAVIORAL HEALTH SCREENINGS?: YES

## 2024-01-25 SDOH — SOCIAL STABILITY: SOCIAL INSECURITY: HAVE YOU HAD THOUGHTS OF HARMING ANYONE ELSE?: NO

## 2024-01-25 SDOH — SOCIAL STABILITY: SOCIAL INSECURITY: DOES ANYONE TRY TO KEEP YOU FROM HAVING/CONTACTING OTHER FRIENDS OR DOING THINGS OUTSIDE YOUR HOME?: NO

## 2024-01-25 SDOH — HEALTH STABILITY: MENTAL HEALTH: CURRENT SMOKER: 0

## 2024-01-25 SDOH — SOCIAL STABILITY: SOCIAL INSECURITY: DO YOU FEEL ANYONE HAS EXPLOITED OR TAKEN ADVANTAGE OF YOU FINANCIALLY OR OF YOUR PERSONAL PROPERTY?: NO

## 2024-01-25 SDOH — SOCIAL STABILITY: SOCIAL INSECURITY: DO YOU FEEL UNSAFE GOING BACK TO THE PLACE WHERE YOU ARE LIVING?: NO

## 2024-01-25 SDOH — SOCIAL STABILITY: SOCIAL INSECURITY: ARE YOU OR HAVE YOU BEEN THREATENED OR ABUSED PHYSICALLY, EMOTIONALLY, OR SEXUALLY BY ANYONE?: NO

## 2024-01-25 SDOH — SOCIAL STABILITY: SOCIAL INSECURITY: HAS ANYONE EVER THREATENED TO HURT YOUR FAMILY OR YOUR PETS?: NO

## 2024-01-25 ASSESSMENT — PATIENT HEALTH QUESTIONNAIRE - PHQ9
1. LITTLE INTEREST OR PLEASURE IN DOING THINGS: NOT AT ALL
SUM OF ALL RESPONSES TO PHQ9 QUESTIONS 1 & 2: 0
2. FEELING DOWN, DEPRESSED OR HOPELESS: NOT AT ALL

## 2024-01-25 ASSESSMENT — ACTIVITIES OF DAILY LIVING (ADL)
FEEDING YOURSELF: INDEPENDENT
WALKS IN HOME: INDEPENDENT
HEARING - RIGHT EAR: FUNCTIONAL
GROOMING: INDEPENDENT
BATHING: INDEPENDENT
DRESSING YOURSELF: INDEPENDENT
LACK_OF_TRANSPORTATION: NO
PATIENT'S MEMORY ADEQUATE TO SAFELY COMPLETE DAILY ACTIVITIES?: YES
TOILETING: INDEPENDENT
HEARING - LEFT EAR: FUNCTIONAL
ADEQUATE_TO_COMPLETE_ADL: YES
JUDGMENT_ADEQUATE_SAFELY_COMPLETE_DAILY_ACTIVITIES: YES

## 2024-01-25 ASSESSMENT — COGNITIVE AND FUNCTIONAL STATUS - GENERAL
PATIENT BASELINE BEDBOUND: NO
DAILY ACTIVITIY SCORE: 24
MOBILITY SCORE: 24
MOBILITY SCORE: 24
DAILY ACTIVITIY SCORE: 24

## 2024-01-25 ASSESSMENT — PAIN - FUNCTIONAL ASSESSMENT
PAIN_FUNCTIONAL_ASSESSMENT: 0-10

## 2024-01-25 ASSESSMENT — LIFESTYLE VARIABLES
HOW OFTEN DO YOU HAVE A DRINK CONTAINING ALCOHOL: 4 OR MORE TIMES A WEEK
HAVE YOU EVER FELT YOU SHOULD CUT DOWN ON YOUR DRINKING: NO
REASON UNABLE TO ASSESS: NO
HOW OFTEN DURING THE LAST YEAR HAVE YOU HAD A FEELING OF GUILT OR REMORSE AFTER DRINKING: MONTHLY
AUDIT-C TOTAL SCORE: 10
AUDIT-C TOTAL SCORE: 10
HOW OFTEN DURING THE LAST YEAR HAVE YOU NEEDED AN ALCOHOLIC DRINK FIRST THING IN THE MORNING TO GET YOURSELF GOING AFTER A NIGHT OF HEAVY DRINKING: MONTHLY
EVER HAD A DRINK FIRST THING IN THE MORNING TO STEADY YOUR NERVES TO GET RID OF A HANGOVER: NO
HOW OFTEN DO YOU HAVE 6 OR MORE DRINKS ON ONE OCCASION: WEEKLY
SKIP TO QUESTIONS 9-10: 0
HOW MANY STANDARD DRINKS CONTAINING ALCOHOL DO YOU HAVE ON A TYPICAL DAY: 7 TO 9
HAVE YOU OR SOMEONE ELSE BEEN INJURED AS A RESULT OF YOUR DRINKING: NO
AUDIT TOTAL SCORE: 4
HOW OFTEN DURING THE LAST YEAR HAVE YOU BEEN UNABLE TO REMEMBER WHAT HAPPENED THE NIGHT BEFORE BECAUSE YOU HAD BEEN DRINKING: NEVER
EVER FELT BAD OR GUILTY ABOUT YOUR DRINKING: NO
AUDIT TOTAL SCORE: 14
HAVE PEOPLE ANNOYED YOU BY CRITICIZING YOUR DRINKING: NO
HOW OFTEN DURING THE LAST YEAR HAVE YOU FOUND THAT YOU WERE NOT ABLE TO STOP DRINKING ONCE YOU HAD STARTED: NEVER
HOW OFTEN DURING THE LAST YEAR HAVE YOU FAILED TO DO WHAT WAS NORMALLY EXPECTED FROM YOU BECAUSE OF DRINKING: NEVER
HAS A RELATIVE, FRIEND, DOCTOR, OR ANOTHER HEALTH PROFESSIONAL EXPRESSED CONCERN ABOUT YOUR DRINKING OR SUGGESTED YOU CUT DOWN: NO

## 2024-01-25 ASSESSMENT — COLUMBIA-SUICIDE SEVERITY RATING SCALE - C-SSRS
1. IN THE PAST MONTH, HAVE YOU WISHED YOU WERE DEAD OR WISHED YOU COULD GO TO SLEEP AND NOT WAKE UP?: NO
2. HAVE YOU ACTUALLY HAD ANY THOUGHTS OF KILLING YOURSELF?: NO
6. HAVE YOU EVER DONE ANYTHING, STARTED TO DO ANYTHING, OR PREPARED TO DO ANYTHING TO END YOUR LIFE?: NO

## 2024-01-25 ASSESSMENT — PAIN SCALES - GENERAL
PAINLEVEL_OUTOF10: 0 - NO PAIN
PAINLEVEL_OUTOF10: 0 - NO PAIN
PAIN_LEVEL: 0
PAINLEVEL_OUTOF10: 0 - NO PAIN

## 2024-01-25 NOTE — ED PROVIDER NOTES
Emergency Department Encounter  [facility] EMERGENCY MEDICINE    Patient: Satinder Silver  MRN: [id number]  : 1960  Date of Evaluation: 2024  ED Provider: Froilan Ba PA-C    ED care was supervised by Dr. Rodriguez who independently examined and evaluated the patient. Please see their attestation note for further details.      Chief Complaint       Chief Complaint   Patient presents with    Vomiting     [place]    (Location/Symptom, Timing/Onset, Context/Setting, Quality, Duration, Modifying Factors, Severity) Note limiting factors.     Satinder Silver is a 63 y.o. male who presents to the emergency department for nausea/vomiting for a few days.  Patient says symptoms started a couple days ago.  Patient does have history alcohol abuse.  Patient said the emesis has been bile-like and bloody.  Denies being on blood thinners.  Denies abdominal pain.  Denies any diarrhea.  Denies any bloody stools.    Limitations to History: none  Records reviewed: EMR     Past History     Past Medical History:   Diagnosis Date    Hypertension 2023     History reviewed. No pertinent surgical history.  Social History     Socioeconomic History    Marital status:      Spouse name: None    Number of children: None    Years of education: None    Highest education level: None   Occupational History    None   Tobacco Use    Smoking status: Never    Smokeless tobacco: Never   Vaping Use    Vaping Use: Never used   Substance and Sexual Activity    Alcohol use: Yes     Alcohol/week: 8.0 standard drinks of alcohol     Types: 8 Glasses of wine per week    Drug use: Never    Sexual activity: Defer   Other Topics Concern    None   Social History Narrative    None     Social Determinants of Health     Financial Resource Strain: Not on file   Food Insecurity: Not on file   Transportation Needs: Not on file   Physical Activity: Not on file   Stress: Not on file   Social Connections: Not on file   Intimate Partner  Violence: Not on file   Housing Stability: Not on file         Medications/Allergies     Previous Medications    CLOTRIMAZOLE (LOTRIMIN) 1 % CREAM    Apply topically 2 times a day as needed (rash).    LISINOPRIL 20 MG TABLET    Take 1 tablet (20 mg) by mouth once daily.     Allergies   Allergen Reactions    Aspirin Unknown        Physical Exam       ED Triage Vitals [01/25/24 1146]   Temperature Heart Rate Respirations BP   36.6 °C (97.9 °F) (!) 130 18 104/58      Pulse Ox Temp Source Heart Rate Source Patient Position   100 % Temporal Monitor Sitting      BP Location FiO2 (%)     Right arm --       Physical Exam  GENERAL APPEARANCE: Awake.  HEENT: Normocephalic. Atraumatic. Sclera anicteric. Tolerates saliva. No trismus.   NECK: Supple. Trachea midline.   CARDIO:  Tachycardic. Radial pulses symmetrical and palpable  LUNGS: Respirations unlabored. CTAB.  ABDOMEN: Soft. Non-distended.   MUSCULOSKELETAL: No acute deformities.   SKIN: Warm and dry. Pale  NEUROLOGICAL: No gross facial drooping.     Diagnostics   Labs:  Labs Reviewed   CBC WITH AUTO DIFFERENTIAL - Abnormal       Result Value    WBC 14.7 (*)     nRBC 0.0      RBC 1.96 (*)     Hemoglobin 4.1 (*)     Hematocrit 15.8 (*)     MCV 81      MCH 20.9 (*)     MCHC 25.9 (*)     RDW 21.5 (*)     Platelets 103 (*)     Neutrophils % 84.2      Immature Granulocytes %, Automated 0.7      Lymphocytes % 8.2      Monocytes % 6.5      Eosinophils % 0.3      Basophils % 0.1      Neutrophils Absolute 12.35 (*)     Immature Granulocytes Absolute, Automated 0.10      Lymphocytes Absolute 1.21      Monocytes Absolute 0.95      Eosinophils Absolute 0.04      Basophils Absolute 0.02     PROTIME-INR - Abnormal    Protime 18.4 (*)     INR 1.6 (*)    COMPREHENSIVE METABOLIC PANEL - Abnormal    Glucose 120 (*)     Sodium 137      Potassium 3.8      Chloride 106      Bicarbonate 24      Anion Gap 11      Urea Nitrogen 39 (*)     Creatinine 0.98      eGFR 87      Calcium 8.4 (*)      Albumin 3.1 (*)     Alkaline Phosphatase 93      Total Protein 6.2 (*)     AST 30      Bilirubin, Total 1.6 (*)     ALT 13     BILIRUBIN, DIRECT - Abnormal    Bilirubin, Direct 0.8 (*)    LIPASE - Normal    Lipase 34      Narrative:     Venipuncture immediately after or during the administration of Metamizole may lead to falsely low results. Testing should be performed immediately prior to Metamizole dosing.   APTT - Normal    aPTT 30      Narrative:     The APTT is no longer used for monitoring Unfractionated Heparin Therapy. For monitoring Heparin Therapy, use the Heparin Assay.   LACTATE - Normal    Lactate 1.5      Narrative:     Venipuncture immediately after or during the administration of Metamizole may lead to falsely low results. Testing should be performed immediately  prior to Metamizole dosing.   TYPE AND SCREEN    ABO TYPE A      Rh TYPE POS      ANTIBODY SCREEN NEG     VERAB/VERIFY ABORH    ABO TYPE A      Rh TYPE POS     PREPARE RBC    PRODUCT CODE Z0127K88      Unit Number [id number]      Unit ABO A      Unit RH POS      XM INTEP COMP      Dispense Status XM      Blood Expiration Date January 31, 2024 23:59 EST      PRODUCT BLOOD TYPE 6200      UNIT VOLUME 350      PRODUCT CODE C1495Z96      Unit Number [id number]      Unit ABO A      Unit RH POS      XM INTEP COMP      Dispense Status XM      Blood Expiration Date February 08, 2024 23:59 EST      PRODUCT BLOOD TYPE 6200      UNIT VOLUME 350      PRODUCT CODE X0109N66      Unit Number [id number]      Unit ABO A      Unit RH POS      XM INTEP COMP      Dispense Status XM      Blood Expiration Date February 07, 2024 23:59 EST      PRODUCT BLOOD TYPE 6200      UNIT VOLUME 350     PREPARE RBC    PRODUCT CODE Y1338B52      Unit Number [id number]      Unit ABO O      Unit RH NEG      Dispense Status TR      Blood Expiration Date February 11, 2024 23:59 EST      PRODUCT BLOOD TYPE 9500      UNIT VOLUME 350      PRODUCT CODE H4576G59       Unit Number [id number]      Unit ABO O      Unit RH NEG      Dispense Status TR      Blood Expiration Date February 18, 2024 23:59 EST      PRODUCT BLOOD TYPE 9500      UNIT VOLUME 350      XM INTEP COMP      XM INTEP COMP     MORPHOLOGY    RBC Morphology See Below      Polychromasia Mild      Ovalocytes Few      Teardrop Cells Few       Radiographs:  CT angio abdomen pelvis w and or wo IV IV contrast    (Results Pending)         EMERGENCY DEPARTMENT COURSE and DIFFERENTIAL DIAGNOSIS/MDM/PLAN:   Satinder Silver is a 63 y.o. male who presented to the emergency department for nausea and vomiting.  Emesis is bile-like and bloody.  Patient is tachycardic on arrival. Differential diagnosis included shock, GI bleed, pancreatitis, malignancy. Pt given IV fluids, Rocephin, Protonix, octreotide. Our workup consisted of ordering/reviewing CBC, CMP, hepatic function panel, lipase, PT/INR, type and screen, CT abdomen/pelvis, EKG and showed hemoglobin 4.1.  Patient will need emergent transfusion.  Discussed risks/benefits/alternatives of blood transfusion with patient at bedside.  Patient signed consent form.  Patient will be transfused 3 units of blood.  BUN 39.  Lipase normal.  Lactic 1.5.  CT angio of abdomen/pelvis was pending at this time.      Presentation concerning for GI bleed.  Patient will be admitted to ICU.    Final Diagnosis:   1. Gastrointestinal hemorrhage with hematemesis    2. Blood loss anemia    3. Hypotension, unspecified hypotension type    4. Tachycardia        Medications   octreotide (SandoSTATIN) 500 mcg in sodium chloride 0.9% 100 mL (5 mcg/mL) infusion (25 mcg/hr intravenous New Bag 1/25/24 1314)   pantoprazole (ProtoNix) injection 80 mg (80 mg intravenous Given 1/25/24 1225)   ondansetron (Zofran) injection 4 mg (4 mg intravenous Given 1/25/24 1225)   sodium chloride 0.9 % bolus 1,000 mL (0 mL intravenous Stopped 1/25/24 1359)   cefTRIAXone (Rocephin) IVPB 1 g (0 g intravenous Stopped 1/25/24  1258)   octreotide (SandoSTATIN) injection 50 mcg (50 mcg intravenous Given 1/25/24 1313)   iohexol (OMNIPaque) 350 mg iodine/mL solution 90 mL (90 mL intravenous Given 1/25/24 1415)       ED Course as of 01/25/24 1504   Thu Jan 25, 2024   1243 US ordered prior to labs coming back. Labs c/w UGIB, feel US is not indicated. Called US at 1242 to bring patient back to room for interventions. [JK]      ED Course User Index  [JK] Jw Rodriguez,          Diagnoses as of 01/25/24 1504   Gastrointestinal hemorrhage with hematemesis   Blood loss anemia   Hypotension, unspecified hypotension type   Tachycardia       CONSULTS:  IP CONSULT TO GASTROENTEROLOGY  IP CONSULT TO NUTRITION SERVICES    PROCEDURES:  Unless otherwise noted below, none     Procedures    DISPOSITION/PLAN  Admit 01/25/2024 02:05:25 PM    PATIENT REFERRED TO:  No follow-up provider specified.    DISCHARGE MEDICATIONS:  New Prescriptions    No medications on file     @[facility]([id number]:LAST:1)@    (Please note:  Portions of this note were completed with a voice recognition program.  Efforts were made to edit the dictations but occasionally words and phrases are mis-transcribed.)  Form [id number]       Froilan Ba PA-C  01/25/24 1509

## 2024-01-25 NOTE — CARE PLAN
"The patient's goals for the shift include \"stop vomitting blood.\"    The clinical goals for the shift include Pt's hemoglobin will remain stable.    Over the shift, the patient did not make progress toward the following goals. Barriers to progression include pt's ETOH use, pt's limited medical literacy. Recommendations to address these barriers include pt education.    "

## 2024-01-25 NOTE — CONSULTS
"Reason for consult  hematemesis    HPI  Satinder Silver is a 63 y.o. male presenting with hematemesis. PMH significant for HTN, heavy EtOH use.  Patient reports having 2 episodes of dark burgundy emesis with chunks yesterday and 2 episodes the day before.  No nausea.  Emesis occurred when he felt he needed to burp.  He does report solid black stool during that time as well.  He has never had previous GI bleed or need for transfusions..  He endorses drinking 1 to 2 boxes of wine every 2 days.  He does think he has had previous withdrawal upon quitting though has never been hospitalized.  He does endorse dyspnea on exertion, heart racing, no chest pain, no current nausea.  No emesis since yesterday.  He has never had an EGD.  He has had colonoscopy in 2019 in [place] with polyps removed.  No chronic NSAIDs, no anticoagulation, no home PPI.  Hemoglobin on arrival 4.1 on arrival with 3 units PRBC's given. CTA results pending.      PMH  HTN, EtOH abuse    PSH  He has no past surgical history on file.    Family  Family History   Problem Relation Name Age of Onset    Stroke Father      Heart failure Maternal Grandfather      Diabetes Paternal Grandmother      Diabetes Paternal Grandfather         Social  No tobacco, drinks 1 to 2 boxes of wine every 2 days.      Review of Systems  ROS negative unless stated otherwise in HPI     Objective  /57   Pulse 100   Temp 37.2 °C (99 °F) (Temporal)   Resp 18   Ht 1.702 m (5' 7\")   Wt 83.9 kg (185 lb)   SpO2 100%   BMI 28.98 kg/m²     Physical Exam  Constitutional: Alert, pleasant and interactive, in NAD, significant other at bedside  Eyes: PERRL, sclera clear, no conjunctival injection  Skin: Warm and dry, no rash or ecchymosis  ENMT: Mucous membranes moist, no lesions noted  Resp: CTAB, even and unlabored  CV: RRR, normal S1, S2, no m,r,g  GI: +BS, soft, round, NT, no rebound tenderness or guarding, no palpable masses or organomegaly  MSK: 5/5 strength, ROM intact, no " "joint swelling  Extremities: Extremities warm, no edema, contusions, wounds or cyanosis  Neuro: Alert and oriented x3  Psych: Appropriate mood and behavior    Medications  Scheduled medications     Continuous medications  octreotide, 25 mcg/hr, Last Rate: 25 mcg/hr (01/25/24 1314)      PRN medications       Labs  Lab Results   Component Value Date    WBC 14.7 (H) 01/25/2024    HGB 4.1 (LL) 01/25/2024    HCT 15.8 (L) 01/25/2024    MCV 81 01/25/2024     (L) 01/25/2024     Lab Results   Component Value Date    GLUCOSE 120 (H) 01/25/2024    CALCIUM 8.4 (L) 01/25/2024     01/25/2024    K 3.8 01/25/2024    CO2 24 01/25/2024     01/25/2024    BUN 39 (H) 01/25/2024    CREATININE 0.98 01/25/2024     Lab Results   Component Value Date    ALT 13 01/25/2024    AST 30 01/25/2024    ALKPHOS 93 01/25/2024    BILITOT 1.6 (H) 01/25/2024     No results found for: \"IRON\", \"TIBC\", \"FERRITIN\"  Lab Results   Component Value Date    INR 1.6 (H) 01/25/2024    PROTIME 18.4 (H) 01/25/2024       Radiology  CTA results pending    Assessment:  Satinder Silver is a 63 y.o. male presenting with hematemesis.  PMH significant for HTN, heavy EtOH use.  Had 2 episodes of dark burgundy emesis 2 days ago and yesterday.  No previous EGD.  No chronic NSAIDs, PPI or AC.    # hematemesis  # melena  # normocytic anemia  # EtOH abuse  # HTN    Plan:  - continue supportive care  - keep NPO for EGD today  - continue to trend hgb daily unless pt becomes symptomatic or decompensates  - transfuse to maintain hgb >7  - continue to monitor for overt signs of bleeding  - no NSAIDS  - ok to continue octreotide drip for now  - ok to continue ceftriaxone for prophylaxis  - PPI BID    Plan has been discussed with Dr. Avila. GI will continue to follow.     Torie Soni, APRN/CNP       I was present with the NP who participated in the documentation of this note. I have personally seen and re-examined the patient and performed the medical decision-making " components (assessment and plan of care). I have reviewed the NP documentation and verified the findings in the note as written with additions or exceptions as stated in the body of this note.    Patient with decompensated cirrhosis (MELD: 13). EGD with grade II varices with red saul sign s/p 3 bands.     -Trend MELD labs daily.  -Continue octreotide gtt.  -Continue antibiotics.  -Will need NSBB closer to discharge with aim of HR: 50-60.  -PPI twice daily.  -Rule out infection (blood cultures, CXR, UA).  -Trend CBC, avoid over transfusion (will increase portal pressures).  -Please maintain 2 large-bore IVs.  -Hepatology referral upon discharge.  -Strict cessation of alcohol, please provide resources.      Hossein Avila, DO

## 2024-01-25 NOTE — ANESTHESIA POSTPROCEDURE EVALUATION
Patient: Satinder Silver    Procedure Summary       Date: 01/25/24 Room / Location:     Anesthesia Start: 1548 Anesthesia Stop: 1705    Procedure: Procedure Not Yet Scheduled Diagnosis:     Scheduled Providers:  Responsible Provider: Domenic Hawley DO    Anesthesia Type: general ASA Status: 3 - Emergent            Anesthesia Type: general    Vitals Value Taken Time   /66 01/25/24 1706   Temp 36.8 01/25/24 1708   Pulse 96 01/25/24 1706   Resp 16 01/25/24 1706   SpO2 98 % 01/25/24 1706   Vitals shown include unvalidated device data.    Anesthesia Post Evaluation    Patient location during evaluation: bedside  Patient participation: complete - patient participated  Level of consciousness: awake and alert  Pain score: 0  Pain management: adequate  Airway patency: patent  Cardiovascular status: acceptable  Respiratory status: acceptable  Hydration status: acceptable  Postoperative Nausea and Vomiting: none        No notable events documented.

## 2024-01-25 NOTE — ANESTHESIA PREPROCEDURE EVALUATION
Patient: Satinder Silver    Procedure Information    Anesthesia Start Date/Time: 01/25/24 1548  Procedure: Procedure Not Yet Scheduled         Relevant Problems   Cardiovascular   (+) Hypertension      GI   (+) Gastrointestinal hemorrhage with hematemesis       Clinical information reviewed:   Tobacco  Allergies  Meds   Med Hx  Surg Hx   Fam Hx  Soc Hx        NPO Detail:  No data recorded     Physical Exam    Airway  Mallampati: III  TM distance: >3 FB  Neck ROM: full     Cardiovascular   Rhythm: regular  Rate: normal     Dental - normal exam     Pulmonary   Breath sounds clear to auscultation     Abdominal   Abdomen: soft             Anesthesia Plan    History of general anesthesia?: yes  History of complications of general anesthesia?: no    ASA 3 - emergent     general     The patient is not a current smoker.  Patient was previously instructed to abstain from smoking on day of procedure.  Patient did not smoke on day of procedure.  Education provided regarding risk of obstructive sleep apnea.  intravenous induction   Anesthetic plan and risks discussed with patient.  Use of blood products discussed with patient who.    Plan discussed with CRNA.

## 2024-01-25 NOTE — H&P
"  History Of Present Illness  Satinder Silver is a 63 year old male with a PMHx of hypertension, alcohol abuse, and an umbilical hernia who presented to the [facility] emergency department with a complaint of hematemesis for the past few days. Patient reports of 2 episodes of bloody emesis 2 days ago stating the emesis appeared wine colored. He reports of another 2 episodes yesterday. He is unable to quantify the amount of blood though states it was \"a lot\". He also reports of melena. This morning he began feeling dizzy, lightheaded, and states he felt unwell. Denies any fever, chills, cough, abdominal pain, hematochezia, dysuria, hematuria, or any other complaints. Denies similar symptoms in the past.     ED course:   Vitals: Afebrile, , RR 18, /58, spo2 100% on room air  Labs: CBC with leukocytosis of 14.7 with neutrophilic predominance. Hgb 4.1, Hematocrit 15.8. CMP BUN 39, Cr 0.98, ALP 93, ALT 13, AST 30, T-bili 1.6, Direct bili 0.8. Lactate 1.5. Lipase 34. PT 18.4, INR 1.6.   Imaging: CTA of the abdomen/pelvis with   Intervention: Patient was ordered and transfused 2u pRBCs. Patient was given Octreotide 50mcg, Protonix 80mg IV, Zofran 4mg, and Rocephin 1g. He was also transfused 1L IVF. Started on a continuous Octreotide infusion. GI was consulted from the ED who plans for endoscopy this afternoon.     PMHx: HTN, alcohol abuse, umbilical hernia  PSHx: Denies  Allergies: Aspirin  Shx: Denies tobacco use, reports frequent EtOH use with about half a box of wine over the course of 3 days each week. Denies illicit drug use.   Code Status: Full code     Past Medical History  He has a past medical history of Hypertension (09/29/2023).    Surgical History  He has no past surgical history on file.     Social History  He reports that he has never smoked. He has never used smokeless tobacco. He reports current alcohol use of about 8.0 standard drinks of alcohol per week. He reports that he does " not use drugs.    Family History  Family History   Problem Relation Name Age of Onset    Stroke Father      Heart failure Maternal Grandfather      Diabetes Paternal Grandmother      Diabetes Paternal Grandfather          Allergies  Aspirin    Review of Systems  -----------------------------------------------------------------  Review of Systems     Constitutional: (+) Lightheadedness; Denies  Fever, Chills    Eyes: Denies Blurry Vision, Vision Loss/ Change    ENMT: Denies Nasal Discharge, Nasal Congestion    Respiratory: Denies Dry Cough, Productive Cough, Wheezing, Shortness of Breath    Cardiac: Denies Chest Pain, Syncope, Palpitations    Gastrointestinal: (+) Nausea, Vomiting, Hematemesis. Denies Diarrhea, Constipation, Abdominal Pain    Genitourinary: Denies Discharge, Dysuria, Flank Pain    Musculoskeletal: Denies Pain, Swelling, Weakness    Neurological:  (+) Dizziness, Denies Confusion, Headache, Syncope    Skin: Denies Pain, Rash, Ulcer    Endocrine: Denies Sweat, Polyuria    Hematologic/Lymph:  Denies Night Sweats, Petechiae    Allergic/Immunologic: Denies Anaphylaxis       Physical Exam  General: Awake, alert/oriented x4, well developed, no acute distress  Head: Atraumatic/Normocephalic  Eyes: Normal external exam, EOMI, PERRLA  ENT: Oropharynx normal. Uvula midline, no tonsillar edema, moist mucous membranes  Cardiovascular: tachycardic, S1/S2, no murmurs, rubs, or gallops, radial pulses +2, no edema of extremities  Pulmonary: CTAB, no respiratory distress. No wheezes, rales, or ronchi  Abdomen: +BS, soft, mild diffuse abdominal tenderness, nondistended, no guarding or rebound, no masses noted  MSK: No joint swelling, normal movements of all extremities. Range of motion- normal.  Extremities: FROM, no edema, wounds, or contusions  Skin- Normal color. No pallor. No lesions, contusions, or erythema.  Neuro: Alert/oriented x4, no focal motor or sensory deficits  Psychiatric: Judgment intact. Appropriate  "mood and behavior       Last Recorded Vitals  Blood pressure 107/61, pulse 100, temperature 37.2 °C (99 °F), temperature source Temporal, resp. rate 20, height 1.702 m (5' 7\"), weight 83.9 kg (185 lb), SpO2 100 %.    Relevant Results       Assessment/Plan     Satinder Silver is a 63 year old male with a PMHx of HTN and EtOH abuse who presented to the [facility] for hematemesis, being treated in the ICU for acute blood loss anemia and hypotension.     #Acute Blood Loss Anemia  #Liver cirrhosis  #Portal HTN  #Hematemesis  #Leukocytosis  #HTN  #Alcohol abuse    Plan   Neuro:   -A&O x4  -Pain: Dilaudid 0.2 and 0.5mg for moderate and severe pain respectively,  -Sedation: None  -CAM-ICU bundle     Cardiovascular:   -Monitor hemodynamics.   -Hemodynamically stable.   -MAP Goal >65.   -Vasopressors/Inotropes: None required   -Hold home antihypertensives    Respiratory:   -Lung Exam findings: CTAB, no wheezes, rales, or ronchi.   -CXR/CT Findings.   -Oxygen requirement: Room air  -SpO2 Goal 92% or greater.     Gastrointestinal:   -Patient presented to the ED for hematemesis and dizziness/lightheadedness  -Hgb 4.1, transfused 2u in the ED  -Received Octreotide, Protonix in the ED  -CTA abdomen/pelvis with cirrhotic appearing liver w/ findings of portal HTN including esophageal varices and a few varices about the lesser curvature of the stomach and upper abd. Splenomegaly. Colonic diverticulosis w/o diverticulitis.   -GI on consultation, plan to take patient to endoscopy. Appreciate further recommendations  -Continue Octreotide infusion  -GI ppx: Protonix 40mg IV BID    Renal/Genitourinary:   -BUN 39, likely elevated in the setting of upper GI bleed. Cr 0.98  -I&O per ICU protocol.     FEN: IVF: transfused 1u LR in the ED, Electrolytes: Replete per ICU protocol. Diet: NPO    Hematologic:   -Hb/Hct 4.1/15.8.   -PT 18.4, INR 1.6  -Transfused 2u pRBC.   -Post transfusion Hgb pending  -Continue to monitor for overt signs of " bleeding  -Continue to monitor daily CBC  -Will transfuse for Hb < 7. DVT ppx:     Endocrine:   -Blood glucose goal <180.   -Monitor glucose with daily BMP  -Hypoglycemic protocol in place.     Infectious Disease:   -Leukocytosis of 14.7   -UA pending  -Received 1g Rocephin in the ED  -Follow daily CBC  -Continue Rocephin 1g daily at this time    Lines: PIV  Diet: NPO  Consults: GI, PT/OT  Code Status: Full code    Assessment and plan to be discussed with attending physician  Sondra Hoffman, DO  Internal Medicine, PGY 2

## 2024-01-25 NOTE — ANESTHESIA PROCEDURE NOTES
Airway  Date/Time: 1/25/2024 3:53 PM  Urgency: elective      Staffing  Performed: CRNA   Authorized by: Domenic Hawley DO    Performed by: REINIER Ventura-CRNA  Patient location during procedure: OR    Indications and Patient Condition  Indications for airway management: anesthesia  Spontaneous ventilation: present  Sedation level: deep  Preoxygenated: yes  Patient position: sniffing  Mask difficulty assessment: 0 - not attempted    Final Airway Details  Final airway type: endotracheal airway      Successful airway: ETT     Successful intubation technique: video laryngoscopy  Facilitating devices/methods: intubating stylet  Blade size: #4  ETT size (mm): 7.5  Cormack-Lehane Classification: grade I - full view of glottis  Placement verified by: capnometry   Measured from: gums  ETT to gums (cm): 22  Number of attempts at approach: 1

## 2024-01-26 ENCOUNTER — APPOINTMENT (OUTPATIENT)
Dept: RADIOLOGY | Facility: HOSPITAL | Age: 64
DRG: 432 | End: 2024-01-26
Payer: OTHER GOVERNMENT

## 2024-01-26 LAB
ALBUMIN SERPL BCP-MCNC: 3 G/DL (ref 3.4–5)
ALP SERPL-CCNC: 82 U/L (ref 33–136)
ALT SERPL W P-5'-P-CCNC: 13 U/L (ref 10–52)
ANION GAP SERPL CALC-SCNC: 13 MMOL/L (ref 10–20)
AST SERPL W P-5'-P-CCNC: 28 U/L (ref 9–39)
BASOPHILS # BLD AUTO: 0.06 X10*3/UL (ref 0–0.1)
BASOPHILS NFR BLD AUTO: 0.6 %
BILIRUB SERPL-MCNC: 2.6 MG/DL (ref 0–1.2)
BUN SERPL-MCNC: 36 MG/DL (ref 6–23)
CALCIUM SERPL-MCNC: 8.2 MG/DL (ref 8.6–10.3)
CHLORIDE SERPL-SCNC: 108 MMOL/L (ref 98–107)
CO2 SERPL-SCNC: 22 MMOL/L (ref 21–32)
CREAT SERPL-MCNC: 1.15 MG/DL (ref 0.5–1.3)
EGFRCR SERPLBLD CKD-EPI 2021: 72 ML/MIN/1.73M*2
EOSINOPHIL # BLD AUTO: 0.11 X10*3/UL (ref 0–0.7)
EOSINOPHIL NFR BLD AUTO: 1.2 %
ERYTHROCYTE [DISTWIDTH] IN BLOOD BY AUTOMATED COUNT: 18.5 % (ref 11.5–14.5)
GLUCOSE SERPL-MCNC: 106 MG/DL (ref 74–99)
HCT VFR BLD AUTO: 24.4 % (ref 41–52)
HGB BLD-MCNC: 7.3 G/DL (ref 13.5–17.5)
HOLD SPECIMEN: NORMAL
IMM GRANULOCYTES # BLD AUTO: 0.1 X10*3/UL (ref 0–0.7)
IMM GRANULOCYTES NFR BLD AUTO: 1.1 % (ref 0–0.9)
INR PPP: 1.4 (ref 0.9–1.1)
LYMPHOCYTES # BLD AUTO: 1.3 X10*3/UL (ref 1.2–4.8)
LYMPHOCYTES NFR BLD AUTO: 13.9 %
MAGNESIUM SERPL-MCNC: 1.5 MG/DL (ref 1.6–2.4)
MCH RBC QN AUTO: 25.3 PG (ref 26–34)
MCHC RBC AUTO-ENTMCNC: 29.9 G/DL (ref 32–36)
MCV RBC AUTO: 85 FL (ref 80–100)
MONOCYTES # BLD AUTO: 0.7 X10*3/UL (ref 0.1–1)
MONOCYTES NFR BLD AUTO: 7.5 %
NEUTROPHILS # BLD AUTO: 7.1 X10*3/UL (ref 1.2–7.7)
NEUTROPHILS NFR BLD AUTO: 75.7 %
NRBC BLD-RTO: 0 /100 WBCS (ref 0–0)
PHOSPHATE SERPL-MCNC: 3.9 MG/DL (ref 2.5–4.9)
PLATELET # BLD AUTO: 75 X10*3/UL (ref 150–450)
POTASSIUM SERPL-SCNC: 3.7 MMOL/L (ref 3.5–5.3)
PROT SERPL-MCNC: 6.1 G/DL (ref 6.4–8.2)
PROTHROMBIN TIME: 15.8 SECONDS (ref 9.8–12.8)
RBC # BLD AUTO: 2.88 X10*6/UL (ref 4.5–5.9)
SODIUM SERPL-SCNC: 139 MMOL/L (ref 136–145)
WBC # BLD AUTO: 9.4 X10*3/UL (ref 4.4–11.3)

## 2024-01-26 PROCEDURE — 84100 ASSAY OF PHOSPHORUS: CPT

## 2024-01-26 PROCEDURE — 2500000004 HC RX 250 GENERAL PHARMACY W/ HCPCS (ALT 636 FOR OP/ED)

## 2024-01-26 PROCEDURE — C9113 INJ PANTOPRAZOLE SODIUM, VIA: HCPCS | Performed by: STUDENT IN AN ORGANIZED HEALTH CARE EDUCATION/TRAINING PROGRAM

## 2024-01-26 PROCEDURE — 99232 SBSQ HOSP IP/OBS MODERATE 35: CPT | Performed by: NURSE PRACTITIONER

## 2024-01-26 PROCEDURE — 71045 X-RAY EXAM CHEST 1 VIEW: CPT

## 2024-01-26 PROCEDURE — 2500000004 HC RX 250 GENERAL PHARMACY W/ HCPCS (ALT 636 FOR OP/ED): Performed by: STUDENT IN AN ORGANIZED HEALTH CARE EDUCATION/TRAINING PROGRAM

## 2024-01-26 PROCEDURE — C9113 INJ PANTOPRAZOLE SODIUM, VIA: HCPCS

## 2024-01-26 PROCEDURE — 85610 PROTHROMBIN TIME: CPT

## 2024-01-26 PROCEDURE — 85025 COMPLETE CBC W/AUTO DIFF WBC: CPT

## 2024-01-26 PROCEDURE — 99233 SBSQ HOSP IP/OBS HIGH 50: CPT

## 2024-01-26 PROCEDURE — 80053 COMPREHEN METABOLIC PANEL: CPT

## 2024-01-26 PROCEDURE — 71045 X-RAY EXAM CHEST 1 VIEW: CPT | Performed by: RADIOLOGY

## 2024-01-26 PROCEDURE — 36415 COLL VENOUS BLD VENIPUNCTURE: CPT

## 2024-01-26 PROCEDURE — 83735 ASSAY OF MAGNESIUM: CPT

## 2024-01-26 PROCEDURE — 1200000002 HC GENERAL ROOM WITH TELEMETRY DAILY

## 2024-01-26 RX ORDER — MAGNESIUM SULFATE HEPTAHYDRATE 40 MG/ML
2 INJECTION, SOLUTION INTRAVENOUS EVERY 6 HOURS PRN
Status: DISCONTINUED | OUTPATIENT
Start: 2024-01-26 | End: 2024-01-28

## 2024-01-26 RX ORDER — POTASSIUM CHLORIDE 14.9 MG/ML
20 INJECTION INTRAVENOUS EVERY 6 HOURS PRN
Status: DISCONTINUED | OUTPATIENT
Start: 2024-01-26 | End: 2024-01-26

## 2024-01-26 RX ORDER — POTASSIUM CHLORIDE 29.8 MG/ML
40 INJECTION INTRAVENOUS EVERY 6 HOURS PRN
Status: DISCONTINUED | OUTPATIENT
Start: 2024-01-26 | End: 2024-01-29 | Stop reason: HOSPADM

## 2024-01-26 RX ORDER — POTASSIUM CHLORIDE 14.9 MG/ML
20 INJECTION INTRAVENOUS
Status: COMPLETED | OUTPATIENT
Start: 2024-01-26 | End: 2024-01-26

## 2024-01-26 RX ORDER — MAGNESIUM SULFATE HEPTAHYDRATE 40 MG/ML
2 INJECTION, SOLUTION INTRAVENOUS ONCE
Status: COMPLETED | OUTPATIENT
Start: 2024-01-26 | End: 2024-01-26

## 2024-01-26 RX ORDER — MAGNESIUM SULFATE HEPTAHYDRATE 40 MG/ML
4 INJECTION, SOLUTION INTRAVENOUS EVERY 6 HOURS PRN
Status: DISCONTINUED | OUTPATIENT
Start: 2024-01-26 | End: 2024-01-28

## 2024-01-26 RX ADMIN — MAGNESIUM SULFATE HEPTAHYDRATE 2 G: 40 INJECTION, SOLUTION INTRAVENOUS at 08:18

## 2024-01-26 RX ADMIN — SODIUM CHLORIDE, POTASSIUM CHLORIDE, SODIUM LACTATE AND CALCIUM CHLORIDE 500 ML: 600; 310; 30; 20 INJECTION, SOLUTION INTRAVENOUS at 12:14

## 2024-01-26 RX ADMIN — PANTOPRAZOLE SODIUM 40 MG: 40 INJECTION, POWDER, FOR SOLUTION INTRAVENOUS at 21:36

## 2024-01-26 RX ADMIN — CEFTRIAXONE SODIUM 1 G: 1 INJECTION, SOLUTION INTRAVENOUS at 12:15

## 2024-01-26 RX ADMIN — PANTOPRAZOLE SODIUM 40 MG: 40 INJECTION, POWDER, FOR SOLUTION INTRAVENOUS at 08:18

## 2024-01-26 RX ADMIN — OCTREOTIDE ACETATE 25 MCG/HR: 500 INJECTION, SOLUTION INTRAVENOUS; SUBCUTANEOUS at 08:07

## 2024-01-26 RX ADMIN — POTASSIUM CHLORIDE 20 MEQ: 14.9 INJECTION, SOLUTION INTRAVENOUS at 10:28

## 2024-01-26 RX ADMIN — POTASSIUM CHLORIDE 20 MEQ: 14.9 INJECTION, SOLUTION INTRAVENOUS at 08:17

## 2024-01-26 ASSESSMENT — COGNITIVE AND FUNCTIONAL STATUS - GENERAL
DAILY ACTIVITIY SCORE: 24
MOBILITY SCORE: 24

## 2024-01-26 ASSESSMENT — PAIN - FUNCTIONAL ASSESSMENT
PAIN_FUNCTIONAL_ASSESSMENT: 0-10

## 2024-01-26 ASSESSMENT — PAIN SCALES - GENERAL
PAINLEVEL_OUTOF10: 0 - NO PAIN

## 2024-01-26 NOTE — NURSING NOTE
1600: PT. Arrived from CCU at this time as transfer. Assessment completed at this time and pt. Remains on ongoing octreotide. No further questions or concerns at this time. Will follow throughout the duration of this shift.    1845: Pt. Orders released from CCU transfer. Pt. Placed on tele. No further questions or concerns. Pt. Ambulating well in the room with no assistance. Call light in reach and the pt. Sitting in the chair at this time. Will follow

## 2024-01-26 NOTE — SIGNIFICANT EVENT
This is a 63-year-old male who presented with multiple episodes of bloody emesis as well as lightheadedness and dizziness found to have a hemoglobin of 4.1.  He was admitted to the ICU and GI was consulted.  He underwent an EGD on 1/25 and had banding of 3 esophageal varices.  He received a total of 4 units PRBCs.  He remains on an octreotide drip, PPI and Rocephin.  He is medically stable for downgrade from the ICU.  Hospital medicine will assume care of the patient upon arrival to the regular medical floor.

## 2024-01-26 NOTE — PROGRESS NOTES
"Satinder Silver is a 63 y.o. male on day 1 of admission presenting with Gastrointestinal hemorrhage with hematemesis.    Subjective   Patient underwent EGD yesterday evening with grade II esophageal varices noted. S/p 3 bands. No acute events overnight. The patient was seen and examined this morning at bedside. Resting comfortably in bed in no acute distress. Reports hunger pains however no nausea, vomiting, diarrhea, fever, chills, cough, or any other complaints at this time.        Objective     Physical Exam  General: Awake, alert/oriented x4, well developed, no acute distress  Head: Atraumatic/Normocephalic  Eyes: Normal external exam, EOMI, PERRLA  ENT: Oropharynx normal. Uvula midline, no tonsillar edema, moist mucous membranes  Cardiovascular: tachycardic, S1/S2, no murmurs, rubs, or gallops, radial pulses +2, no edema of extremities  Pulmonary: CTAB, no respiratory distress. No wheezes, rales, or ronchi  Abdomen: +BS, soft, mild diffuse abdominal tenderness, nondistended, no guarding or rebound, no masses noted  MSK: No joint swelling, normal movements of all extremities. Range of motion- normal.  Extremities: FROM, no edema, wounds, or contusions  Skin- Normal color. No pallor. No lesions, contusions, or erythema.  Neuro: Alert/oriented x4, no focal motor or sensory deficits  Psychiatric: Judgment intact. Appropriate mood and behavior    Last Recorded Vitals  Blood pressure 126/73, pulse 92, temperature 37 °C (98.6 °F), resp. rate 17, height 1.702 m (5' 7.01\"), weight 90.3 kg (199 lb 1.2 oz), SpO2 98 %.  Intake/Output last 3 Shifts:  I/O last 3 completed shifts:  In: 3988.2 (44.2 mL/kg) [I.V.:838.2 (9.3 mL/kg); Blood:2100; IV Piggyback:1050]  Out: 1030 (11.4 mL/kg) [Urine:1025 (0.3 mL/kg/hr); Blood:5]  Weight: 90.3 kg     Relevant Results                             Assessment/Plan   Principal Problem:    Gastrointestinal hemorrhage with hematemesis    Satinder Silver is a 63 year old male with a PMHx of HTN " and EtOH abuse who presented to the [facility] for hematemesis, being treated in the ICU for acute blood loss anemia and hypotension.      #Acute Blood Loss Anemia  #Liver cirrhosis  #Portal HTN  #Hematemesis  #Leukocytosis  #HTN  #Alcohol abuse     Plan   Neuro:   -A&O x4  -Pain: Dilaudid 0.2 and 0.5mg for moderate and severe pain respectively,  -Sedation: None  -CAM-ICU bundle      Cardiovascular:   -Monitor hemodynamics.   -Hemodynamically stable.   -MAP Goal >65.   -Vasopressors/Inotropes: None required   -Hold home antihypertensives     Respiratory:   -Lung Exam findings: CTAB, no wheezes, rales, or ronchi.   -CXR/CT Findings.   -Oxygen requirement: Room air  -SpO2 Goal 92% or greater.      Gastrointestinal:   -Patient presented to the ED for hematemesis and dizziness/lightheadedness  -Hgb 4.1, transfused 2u in the ED  -Received Octreotide, Protonix in the ED  -MELD score 15  -CTA abdomen/pelvis with cirrhotic appearing liver w/ findings of portal HTN including esophageal varices and a few varices about the lesser curvature of the stomach and upper abd. Splenomegaly. Colonic diverticulosis w/o diverticulitis.   -EGD on 1/25 with grade II varices s/p 3 bands. Transfused an additional 2u pRBC during EGD.  -GI on consultation, advance to CLD.  -Continue Octreotide infusion  -GI ppx: Protonix 40mg IV BID     Renal/Genitourinary:   -BUN 36/Cr 1.15  -I&O per ICU protocol.      FEN: IVF: transfused 1L LR in the ED, Electrolytes: Replete per ICU protocol. Diet: CLD     Hematologic:   -Hb/Hct 4.1/15.8. on arrival to the ED. Improved to 7.3 after 4u pRBC  -Continue to monitor for overt signs of bleeding  -Continue to monitor daily CBC  -Will transfuse for Hb < 7.      Endocrine:   -Blood glucose goal <180.   -Monitor glucose with daily BMP  -Hypoglycemic protocol in place.      Infectious Disease:   -Leukocytosis of 14.7   -UA negative  -Received 1g Rocephin in the ED  -Follow daily CBC  -Continue Rocephin 1g daily at this  time. Plan for 7 days of treatment. Anticipate we can transition to PO in the next 24h.     Lines: PIV  Diet: CLD  Consults: GI, PT/OT  Code Status: Full code  DVT ppx: SCDs    Dispo: Patient medically cleared for downgrade from ICU     Assessment and plan to be discussed with attending physician  Sondra Hoffman DO  Internal Medicine, PGY 2

## 2024-01-26 NOTE — CONSULTS
"Nutrition Assessment Note  Nutrition Assessment      Reason for Assessment  Reason for Assessment: Provider consult order  Nutrition Note:  Satinder Silver is a 63 y.o. male presenting 1/25 with hematemesis; Hgb 4.1. Pt s/p 3u PRBCs and emergent EGD 1/25 finding EGD with grade II varices with red saul sign s/p 3 bands. CT A/P 1/25 work up revealing decompensated cirrhosis with portal hypertension and splenomegaly. Pt currently dependent on octreotide and PPI 2x/day.     Past Medical History  HTN, ETOH use (reportedly 2 boxes of wine every other day).  Surgical History   has no past surgical history on file.     Food and Nutrient History  Energy Intake: Fair 50-75 %  Food and Nutrient History: Pt from home with wife; pt relates good appetite up until a few days PTA. Pt denies food allergies/intolerances.  Vitamin/Herbal Supplement Use: none reported       Current Diet: Adult diet Full Liquid  Food allergies: NKFA. is allergic to aspirin.    GI assessment: Pt reports emesis.  Oral Problems: denies  Mobility: denies    Pain Assessment: 0-10  Pain Score: 0 - No pain    Vitals: Blood pressure 135/70, pulse 92, temperature 37 °C (98.6 °F), resp. rate 18, height 1.702 m (5' 7.01\"), weight 90.3 kg (199 lb 1.2 oz), SpO2 100 %.    Recent Lab Results:  Lab Results   Component Value Date    GLUCOSE 106 (H) 01/26/2024    CALCIUM 8.2 (L) 01/26/2024     01/26/2024    K 3.7 01/26/2024    CO2 22 01/26/2024     (H) 01/26/2024    BUN 36 (H) 01/26/2024    CREATININE 1.15 01/26/2024     No results found for: \"HGBA1C\"        Medications reviewed:  cefTRIAXone, 1 g, intravenous, q24h  lactated Ringer's, 500 mL, intravenous, Once  pantoprazole, 40 mg, intravenous, BID  potassium chloride, 20 mEq, intravenous, q2h       octreotide, 25 mcg/hr, Last Rate: 25 mcg/hr (01/26/24 0807)         Height:  170.2cm   IBW 67.3kg  Admission Weight: 83.9 kg (185 lb) --stated; 1/26: 90.3kg measured  BMI 31kg/m2  Weight history/ % weight change: "   9/2023: 92.8kg  9/2020: 88.6kg  Significant Weight Loss:no                                                                         Estimated Energy Needs  Total Energy Estimated Needs (kCal):  (2000-2250kcal (20-23kcal/kg of 90.3kg))    Estimated Protein Needs  Total Protein Estimated Needs (g):  (80-100g (1.2-1.5g/kg IBW of 67.3kg))    Estimated Fluid Needs  Total Fluid Estimated Needs (mL):  (1mL/kcal/d or as per physician)         Nutrition Focused Physical Findings:  Subcutaneous Fat Loss  Orbital Fat Pads: Well nourshed (slightly bulging fat pads)  Buccal Fat Pads: Well nourished (full, rounded cheeks)    Muscle Wasting  Temporalis: Well nourished (well-defined muscle)  Pectoralis (Clavicular Region): Well nourished (clavicle not visible)    Edema  Edema: none         Physical Findings (Nutrition Deficiency/Toxicity)  Hair: Negative  Skin: Negative       Nutrition Diagnosis   Malnutrition Diagnosis  Patient has Malnutrition Diagnosis: No    Patient has Nutrition Diagnosis: Yes  Nutrition Diagnosis 1: Inadequate oral intake  Diagnosis Status (1): New  Related to (1): altered GI function  As Evidenced by (1): + GI bleed/esophageal varices and s/p banding                                            Nutrition Interventions/Recommendations   Nutrition Prescription  Individualized Nutrition Prescription Provided for : healthful diet.    Food and/or Nutrient Delivery Interventions  Meals and Snacks: Fiber-modified diet  Goal: diet advanced to solids within 48 hours.                 DIET: Advance diet as appropriate to low fiber. Pt may best tolerate small and frequent meals.                                                            Coordination of Nutrition Care by a Nutrition Professional  Collaboration and Referral of Nutrition Care: Collaboration by nutrition professional with other providers  Goal: RN Eli    Education Documentation: Spoke with pt via bedside; tolerated lemon icee and water well. [facility] services  reviewed; aware can have small frequent meals/snacks. Pt denies need for further MNT intervention.        Nutrition Monitoring and Evaluation   Food and Nutrient Related History            Amount of Food: Estimated amout of food  Criteria: >75% meals/snacks              Mealtime Behavior: Patient/client/caregiver fatigue during feeding process resulting in inadequate intake  Criteria: avoidance of refusal or skipping of meals              Anthropometrics: Body Composition/Growth/Weight History  Weight: Measured weight  Criteria: daily weight                   Biochemical Data, Medical Tests and Procedures  Electrolyte and Renal Panel: Magnesium, Phosphorus, Potassium, Sodium  Criteria: lytes WNR    Gastrointestinal Profile: Gastric emptying time  Criteria: pt will tolerate oral diet without nausea/vomiting.                   Nutrition Focused Physical Findings                      Skin: Other (Comment)  Criteria: promote skin integrity              Follow Up  Time Spent (min): 45 minutes  Last Date of Nutrition Visit: 01/26/24  Nutrition Follow-Up Needed?: 5-7 days  Follow up Comment: ks

## 2024-01-26 NOTE — PROGRESS NOTES
"Subjective  Patient sitting up in chair in NAD.  S/p EGD with banding of esophageal varices today.  Hemoglobin stable at 7.3.  No further bleeding noted.  Plan to continue ceftriaxone x 7 days, octreotide drip for 72 hours and PPI twice daily.  Repeat EGD in 4 weeks.  Patient will need to establish care for management of cirrhosis.    Objective  Blood pressure 135/70, pulse 92, temperature 37 °C (98.6 °F), resp. rate 18, height 1.702 m (5' 7.01\"), weight 90.3 kg (199 lb 1.2 oz), SpO2 100 %.    Physical Exam  Constitutional: Alert, pleasant and interactive, in NAD  Eyes: PERRL, sclera clear, no conjunctival injection  Skin: Warm and dry, no rash or ecchymosis  ENMT: Mucous membranes moist, no lesions noted  Resp: CTAB, even and unlabored  CV: RRR, normal S1, S2, no m,r,g  GI: +BS, soft, round, NT, no rebound tenderness or guarding, no palpable masses or organomegaly  MSK: 5/5 strength, ROM intact, no joint swelling  Extremities: Extremities warm, no edema, contusions, wounds or cyanosis  Neuro: Alert and oriented x3  Psych: Appropriate mood and behavior    Medications  Scheduled medications  cefTRIAXone, 1 g, intravenous, q24h  pantoprazole, 40 mg, intravenous, BID      Continuous medications  octreotide, 25 mcg/hr, Last Rate: 25 mcg/hr (01/26/24 0807)      PRN medications  PRN medications: HYDROmorphone, HYDROmorphone, magnesium sulfate, magnesium sulfate, potassium chloride     Labs  Lab Results   Component Value Date    WBC 9.4 01/26/2024    HGB 7.3 (L) 01/26/2024    HCT 24.4 (L) 01/26/2024    MCV 85 01/26/2024    PLT 75 (L) 01/26/2024     Lab Results   Component Value Date    GLUCOSE 106 (H) 01/26/2024    CALCIUM 8.2 (L) 01/26/2024     01/26/2024    K 3.7 01/26/2024    CO2 22 01/26/2024     (H) 01/26/2024    BUN 36 (H) 01/26/2024    CREATININE 1.15 01/26/2024     Lab Results   Component Value Date    ALT 13 01/26/2024    AST 28 01/26/2024    ALKPHOS 82 01/26/2024    BILITOT 2.6 (H) 01/26/2024     No " "results found for: \"IRON\", \"TIBC\", \"FERRITIN\"  Lab Results   Component Value Date    INR 1.4 (H) 01/26/2024    INR 1.6 (H) 01/25/2024    PROTIME 15.8 (H) 01/26/2024    PROTIME 18.4 (H) 01/25/2024     CT angio A/P with/without IV contrast 1/25/2024 noting:  IMPRESSION:  1.  No evidence of active GI bleed. No obvious source of GI bleeding  is seen. There is mild stranding about the duodenal C-loop,  suggestive of duodenitis.      2. Cirrhotic appearing liver with findings of portal hypertension  including esophageal varices and a few varices about the lesser  curvature of the stomach and in the upper abdomen.      3. Colonic diverticulosis without diverticulitis.      4. Splenomegaly.      Signed by: Ash Hernadez 1/25/2024 2:42 PM  Dictation workstation:   [id number]    Assessment:  Satinder Silver is a 63 y.o. male presenting with hematemesis.  PMH significant for HTN, heavy EtOH use.  Had 2 episodes of dark burgundy emesis 2 days ago and yesterday.  No previous EGD.  No chronic NSAIDs, PPI or AC.  MELD Na+score 15.    Patient underwent EGD with Dr. Avila yesterday noting grade II esophageal varices with red saul sign s/p 3 bands, small hiatal hernia, clotted blood in stomach s/p suction clearance.  Normal duodenal bulb.  No further bleeding.  Hemoglobin stable at 7.3.     # Grade II esophageal varices s/p banding x 3  # melena-no further episodes  # normocytic anemia-stable  # Decompensated EtOH cirrhosis=  # Hiatal hernia     Plan:  - continue supportive care  - okay to advance to full liquid diet, may advance to low-sodium if tolerated.  - continue to trend hgb daily unless pt becomes symptomatic or decompensates  - transfuse to maintain hgb >7-avoid over transfusion as this will increase portal pressures and worsen bleeding tendency  - continue to monitor for overt signs of bleeding  - no NSAIDS  - continue octreotide drip x 72 hours  - continue ceftriaxone x 7 days for variceal bleed  - continue PPI BID  - " continue to encourage alcohol cessation  - refer to hepatology for further management of cirrhosis at discharge- info placed in discharge follow up     Plan has been discussed with Dr. Harris. GI will continue to follow.      REINIER Lama/CNP

## 2024-01-26 NOTE — PROGRESS NOTES
Occupational Therapy                 Therapy Communication Note    Patient Name: Satinder Silver  MRN: [id number]  Today's Date: 1/26/2024     Discipline: Occupational Therapy    Missed Visit Reason: Missed Visit Reason:  (patient screen'd out independent at this time as demonstrating independence with mobility, transfers and ADLs; OT to sign off at this time, please reconsult as needed)    Missed Time: Attempt

## 2024-01-26 NOTE — CARE PLAN
The patient's goals for the shift include      The clinical goals for the shift include Pt's hemoglobin will remain stable.      Problem: Skin  Goal: Decreased wound size/increased tissue granulation at next dressing change  Outcome: Progressing  Goal: Participates in plan/prevention/treatment measures  Outcome: Progressing  Goal: Prevent/manage excess moisture  Outcome: Progressing  Goal: Prevent/minimize sheer/friction injuries  Outcome: Progressing  Goal: Promote/optimize nutrition  Outcome: Progressing  Goal: Promote skin healing  Outcome: Progressing     Problem: Pain  Goal: My pain/discomfort is manageable  Outcome: Progressing     Problem: Safety  Goal: Patient will be injury free during hospitalization  Outcome: Progressing  Goal: I will remain free of falls  Outcome: Progressing     Problem: Daily Care  Goal: Daily care needs are met  Outcome: Progressing     Problem: Psychosocial Needs  Goal: Demonstrates ability to cope with hospitalization/illness  Outcome: Progressing  Goal: Collaborate with me, my family, and caregiver to identify my specific goals  Outcome: Progressing     Problem: Discharge Barriers  Goal: My discharge needs are met  Outcome: Progressing     Problem: Fall/Injury  Goal: Not fall by end of shift  Outcome: Progressing  Goal: Be free from injury by end of the shift  Outcome: Progressing  Goal: Verbalize understanding of personal risk factors for fall in the hospital  Outcome: Progressing  Goal: Verbalize understanding of risk factor reduction measures to prevent injury from fall in the home  Outcome: Progressing  Goal: Use assistive devices by end of the shift  Outcome: Progressing  Goal: Pace activities to prevent fatigue by end of the shift  Outcome: Progressing

## 2024-01-26 NOTE — PROGRESS NOTES
Physical Therapy                 Therapy Communication Note    Patient Name: Satinder Silver  MRN: [id number]  Today's Date: 1/26/2024     Discipline: Physical Therapy    PT order rec'd and chart reviewed. Performed functional screen. Pt I with sit<>stand and AMB within room without device; supervision only for line management. Pt educated on importance of sitting up in chair and AMB ad pierre with staff assist to prevent complications of bedrest. Pt denies any previous gait/balance disorders. No skilled PT needs determined at this time. RN notified. Please re-consult if new needs arise. Pt left up in chair with chair alarm on.

## 2024-01-27 LAB
ANION GAP SERPL CALC-SCNC: 11 MMOL/L (ref 10–20)
BACTERIA UR CULT: NO GROWTH
BUN SERPL-MCNC: 23 MG/DL (ref 6–23)
CALCIUM SERPL-MCNC: 8 MG/DL (ref 8.6–10.3)
CHLORIDE SERPL-SCNC: 103 MMOL/L (ref 98–107)
CO2 SERPL-SCNC: 22 MMOL/L (ref 21–32)
CREAT SERPL-MCNC: 0.96 MG/DL (ref 0.5–1.3)
EGFRCR SERPLBLD CKD-EPI 2021: 89 ML/MIN/1.73M*2
ERYTHROCYTE [DISTWIDTH] IN BLOOD BY AUTOMATED COUNT: 20.5 % (ref 11.5–14.5)
GLUCOSE SERPL-MCNC: 95 MG/DL (ref 74–99)
HCT VFR BLD AUTO: 25.2 % (ref 41–52)
HGB BLD-MCNC: 6.9 G/DL (ref 13.5–17.5)
MAGNESIUM SERPL-MCNC: 1.62 MG/DL (ref 1.6–2.4)
MCH RBC QN AUTO: 25.6 PG (ref 26–34)
MCHC RBC AUTO-ENTMCNC: 27.4 G/DL (ref 32–36)
MCV RBC AUTO: 93 FL (ref 80–100)
NRBC BLD-RTO: 0 /100 WBCS (ref 0–0)
PHOSPHATE SERPL-MCNC: 3.6 MG/DL (ref 2.5–4.9)
PLATELET # BLD AUTO: 70 X10*3/UL (ref 150–450)
POTASSIUM SERPL-SCNC: 3.9 MMOL/L (ref 3.5–5.3)
RBC # BLD AUTO: 2.7 X10*6/UL (ref 4.5–5.9)
SODIUM SERPL-SCNC: 132 MMOL/L (ref 136–145)
WBC # BLD AUTO: 7.9 X10*3/UL (ref 4.4–11.3)

## 2024-01-27 PROCEDURE — 99233 SBSQ HOSP IP/OBS HIGH 50: CPT | Performed by: STUDENT IN AN ORGANIZED HEALTH CARE EDUCATION/TRAINING PROGRAM

## 2024-01-27 PROCEDURE — 36415 COLL VENOUS BLD VENIPUNCTURE: CPT | Performed by: STUDENT IN AN ORGANIZED HEALTH CARE EDUCATION/TRAINING PROGRAM

## 2024-01-27 PROCEDURE — 80048 BASIC METABOLIC PNL TOTAL CA: CPT | Performed by: STUDENT IN AN ORGANIZED HEALTH CARE EDUCATION/TRAINING PROGRAM

## 2024-01-27 PROCEDURE — 84100 ASSAY OF PHOSPHORUS: CPT | Performed by: STUDENT IN AN ORGANIZED HEALTH CARE EDUCATION/TRAINING PROGRAM

## 2024-01-27 PROCEDURE — 2500000004 HC RX 250 GENERAL PHARMACY W/ HCPCS (ALT 636 FOR OP/ED): Performed by: STUDENT IN AN ORGANIZED HEALTH CARE EDUCATION/TRAINING PROGRAM

## 2024-01-27 PROCEDURE — 83735 ASSAY OF MAGNESIUM: CPT | Performed by: STUDENT IN AN ORGANIZED HEALTH CARE EDUCATION/TRAINING PROGRAM

## 2024-01-27 PROCEDURE — 85027 COMPLETE CBC AUTOMATED: CPT | Performed by: STUDENT IN AN ORGANIZED HEALTH CARE EDUCATION/TRAINING PROGRAM

## 2024-01-27 PROCEDURE — 1100000001 HC PRIVATE ROOM DAILY

## 2024-01-27 PROCEDURE — C9113 INJ PANTOPRAZOLE SODIUM, VIA: HCPCS | Performed by: STUDENT IN AN ORGANIZED HEALTH CARE EDUCATION/TRAINING PROGRAM

## 2024-01-27 RX ADMIN — CEFTRIAXONE SODIUM 1 G: 1 INJECTION, SOLUTION INTRAVENOUS at 12:26

## 2024-01-27 RX ADMIN — OCTREOTIDE ACETATE 25 MCG/HR: 500 INJECTION, SOLUTION INTRAVENOUS; SUBCUTANEOUS at 08:12

## 2024-01-27 RX ADMIN — PANTOPRAZOLE SODIUM 40 MG: 40 INJECTION, POWDER, FOR SOLUTION INTRAVENOUS at 08:12

## 2024-01-27 RX ADMIN — PANTOPRAZOLE SODIUM 40 MG: 40 INJECTION, POWDER, FOR SOLUTION INTRAVENOUS at 20:58

## 2024-01-27 ASSESSMENT — COGNITIVE AND FUNCTIONAL STATUS - GENERAL
MOBILITY SCORE: 24
DAILY ACTIVITIY SCORE: 24
MOBILITY SCORE: 24
MOBILITY SCORE: 24
DAILY ACTIVITIY SCORE: 24
DAILY ACTIVITIY SCORE: 24

## 2024-01-27 ASSESSMENT — PAIN SCALES - GENERAL
PAINLEVEL_OUTOF10: 0 - NO PAIN

## 2024-01-27 NOTE — NURSING NOTE
No acute events this shift.  Patient alert and oriented x 4.  Denies need for any pain meds this shift.  Independent in room and calls for assist as needed.

## 2024-01-27 NOTE — NURSING NOTE
Pt calm and cooperative through shift. Pt ambulated in room by self and tolerated advanced diet. Pt had octreotide drip running through shift. Pt had Iv abx ths shift.

## 2024-01-27 NOTE — CARE PLAN
Problem: Skin  Goal: Prevent/minimize sheer/friction injuries  Outcome: Progressing     Problem: Pain  Goal: My pain/discomfort is manageable  Outcome: Progressing     Problem: Safety  Goal: Patient will be injury free during hospitalization  Outcome: Progressing     Problem: Daily Care  Goal: Daily care needs are met  Outcome: Progressing     Problem: Psychosocial Needs  Goal: Demonstrates ability to cope with hospitalization/illness  Outcome: Progressing   The patient's goals for the shift include      The clinical goals for the shift include See care plan

## 2024-01-27 NOTE — PROGRESS NOTES
"Satinder Silver is a 63 y.o. male on day 2 of admission presenting with Gastrointestinal hemorrhage with hematemesis.    Subjective   Patient seen and examined at bedside.  Patient reports that he is doing okay today he is hopeful to go home tomorrow.  No further bleeding. All questions answered.       Objective     Physical Exam  Constitutional: Well developed, no distress, alert and cooperative  Skin: Warm and dry  Eyes: EOMI, clear sclera  ENMT: mucous membranes moist  Respiratory: Patent airways, CTAB  Cardiovascular: slightly tachy  Abdominal: Nondistended, soft, non-tender, +BS  MSK: ROM intact  Neuro: alert and oriented x3    Last Recorded Vitals  Blood pressure 114/70, pulse 91, temperature 36.9 °C (98.4 °F), temperature source Temporal, resp. rate 18, height 1.702 m (5' 7.01\"), weight 93.5 kg (206 lb 2.1 oz), SpO2 99 %.  Intake/Output last 3 Shifts:  I/O last 3 completed shifts:  In: 2830.7 (30.3 mL/kg) [P.O.:1200; I.V.:164 (1.8 mL/kg); IV Piggyback:1466.7]  Out: 1125 (12 mL/kg) [Urine:1125 (0.3 mL/kg/hr)]  Weight: 93.5 kg     Relevant Results  Scheduled medications  cefTRIAXone, 1 g, intravenous, q24h  pantoprazole, 40 mg, intravenous, BID      Continuous medications  octreotide, 25 mcg/hr, Last Rate: 25 mcg/hr (01/27/24 0812)      PRN medications  PRN medications: HYDROmorphone, HYDROmorphone, magnesium sulfate, magnesium sulfate, potassium chloride  Results from last 7 days   Lab Units 01/26/24  0430 01/25/24 2025 01/25/24  1220   WBC AUTO x10*3/uL 9.4  --  14.7*   RBC AUTO x10*6/uL 2.88*  --  1.96*   HEMOGLOBIN g/dL 7.3* 6.9* 4.1*     Results from last 7 days   Lab Units 01/26/24  0430 01/25/24  1220   SODIUM mmol/L 139 137   POTASSIUM mmol/L 3.7 3.8   CHLORIDE mmol/L 108* 106   CO2 mmol/L 22 24   BUN mg/dL 36* 39*   CREATININE mg/dL 1.15 0.98   CALCIUM mg/dL 8.2* 8.4*   PHOSPHORUS mg/dL 3.9  --    MAGNESIUM mg/dL 1.50*  --    BILIRUBIN TOTAL mg/dL 2.6* 1.6*   ALT U/L 13 13   AST U/L 28 30       XR chest " 1 view    Result Date: 1/26/2024  Interpreted By:  Jose Hahn, STUDY: XR CHEST 1 VIEW;  1/26/2024 8:54 am   INDICATION: Signs/Symptoms:rule out infectious process.   COMPARISON: None.   ACCESSION NUMBER(S): [id number]   ORDERING CLINICIAN: MAX ZIEGLER   FINDINGS: The lungs are clear without apparent pleural effusion. Unremarkable cardiomediastinal silhouette. No pulmonary vascular congestion.       No active disease in the chest.     Signed by: Jose Hahn 1/26/2024 9:16 AM Dictation workstation:   [id number]    EGD    Result Date: 1/25/2024  Table formatting from the original result was not included. Findings/Impression Grade II esophageal varices with red saul sign s/p 3 bands. Small hiatal hernia. Clotted blood in the stomach s/p suction clearance. The duodenum appeared normal (albeit edematous bulb). Recommendation  Octreotide gtt. PPI BID. Rule out infection. Antibiotics. Repeat EGD in 4 weeks. Rest per GI consult note.  Indication Gastrointestinal hemorrhage with hematemesis Medications See Anesthesia Record. Preprocedure A history and physical has been performed, and patient medication allergies have been reviewed. The patient's tolerance of previous anesthesia has been reviewed. The risks and benefits of the procedure and the sedation options and risks were discussed with the patient. All questions were answered and informed consent obtained. Details of the Procedure The patient underwent monitored anesthesia care, which was administered by an anesthesia professional. The patient's blood pressure, ECG, ETCO2, heart rate, level of consciousness, oxygen and respirations were monitored throughout the procedure. The scope was introduced through the mouth and advanced to the second part of the duodenum. Retroflexion was performed in the cardia. The patient experienced no blood loss. The procedure was not difficult. The patient tolerated the procedure well. There were no apparent adverse events. Events  Procedure Events Event Event Time ENDO SCOPE IN TIME 1/25/2024  3:56 PM ENDO SCOPE OUT TIME 1/25/2024  4:06 PM Specimens No specimens collected Procedure Location [facility] [address] [facility] [id number] [phone] Referring Provider Torie Soni, REINIER-CNP [address] [place] [address] Procedure Provider LUDWIN Avila     CT angio abdomen pelvis w and or wo IV IV contrast    Result Date: 1/25/2024  Interpreted By:  Ash Hernadez, STUDY: CT ANGIO ABDOMEN PELVIS W AND/OR WO IV IV CONTRAST;  1/25/2024 2:22 pm   INDICATION: Signs/Symptoms:UGIB, eval for arterial.   COMPARISON: None.   ACCESSION NUMBER(S): [id number]   ORDERING CLINICIAN: TINY SZYMANSKI   TECHNIQUE: Helical non-contrast images of the abdomen, and pelvis. Helical CT images of the abdomen and pelvis after the intravenous administration of 90 mL of Omnipaque 350 using CT angiographic technique with coronal and sagittal reformatted images.  MIP images were provided and reviewed. 3D reconstructions were performed on a separate independent workstation. Venous phase abdomen pelvis also performed, with axial coronal and sagittal images provided.   FINDINGS: VASCULAR:   GI: No areas of active bleeding are evident.   ABDOMINAL AORTA: No abdominal aortic aneurysm or dissection. No significant atherosclerosis.   ABDOMINAL AND PELVIC ARTERIES: No hemodynamically significant stenosis or occlusion. Incidental note is made of an accessory left hepatic artery arising from the left gastric artery.     LOWER CHEST:   HEART: Included portions of the heart is unremarkable without pericardial effusion.   LUNG BASES: No infiltrate or pleural effusion.   OSSEOUS STRUCTURES/CHEST WALL:  Degenerative changes of the spine. No acute osseous abnormality.     ABDOMEN/PELVIS:   LIVER: The liver is heterogeneous in enhancement and demonstrates capsular nodularity. There is recanalization of the umbilical veins.    BILE DUCTS: No significant abnormality.   GALLBLADDER: No significant abnormality.   PANCREAS: No significant abnormality.   SPLEEN: The spleen is enlarged.   ADRENALS: No significant abnormality.   KIDNEYS, URETERS, BLADDER: Subcentimeter ovoid hypodensity in the anterior cortex of the left kidney, midpole. Probably a tiny cyst. There is no urolithiasis or hydroureteronephrosis. The urinary bladder is incompletely distended.   VESSELS: See above.  Are numerous esophageal varices evident. A few varices along the lesser curvature of the stomach is also noted.   LYMPH NODES: No enlarged lymph nodes.   RETROPERITONEUM:  No significant abnormality.   BOWEL: There is very mild stranding about the duodenal C-loop, suggesting mild duodenitis. There is scattered colonic diverticulosis without evidence of diverticulitis. There is no evidence of obstruction. Normal appendix.   PERITONEUM: No significant ascites, free air, or fluid collection.   REPRODUCTIVE ORGANS: No significant abnormality.   OSSEOUS STRUCTURES:  There are degenerative changes of the spine with facet arthropathy. No acute osseous abnormalities identified.   ABDOMINAL WALL: There is a tiny umbilical hernia containing fat and a small amount of fluid. This measures approximately 3.5 cm.       1.  No evidence of active GI bleed. No obvious source of GI bleeding is seen. There is mild stranding about the duodenal C-loop, suggestive of duodenitis.   2. Cirrhotic appearing liver with findings of portal hypertension including esophageal varices and a few varices about the lesser curvature of the stomach and in the upper abdomen.   3. Colonic diverticulosis without diverticulitis.   4. Splenomegaly.     Signed by: Ash Hernadez 1/25/2024 2:42 PM Dictation workstation:   [id number]       Assessment/Plan   Principal Problem:    Gastrointestinal hemorrhage with hematemesis  #Acute Blood Loss Anemia  #Liver cirrhosis  #Portal  HTN  #Hematemesis  #Leukocytosis  #HTN  #Alcohol abuse    -Hg 4.1 on arrival   -received total of 4u pRBCs this stay   -Hg 7.3 yesterday, f/u AM labs    -transfuse for Hg <7   -GI consulted   -CTA abdomen/pelvis with cirrhotic appearing liver w/ findings of portal HTN including esophageal varices and a few varices about the lesser curvature of the stomach and upper abd. Splenomegaly. Colonic diverticulosis w/o diverticulitis.   -s/p EGD on 1/25 with grade II varices s/p 3 bands  -continue octreotide for 72hrs, complete ~ 12pm tomorrow   -continue PPI BID  -continue rocephin x7d   -continue home meds   -advance diet to low sodium   -DVT ppx: no pharmacologic due to bleeding, encourage ambulation    Dispo; This is a 62 y/o M admitted with severe anemia due to bleeding varices. GI consulted. Underwent EGD on 1/25 with variceal banding. Hg stable. Received 4u pRBCs total this stay. Currently on octreotide drip, PPI BID and rocephin. Anticipate discharge in 24-48hrs.       Roro Solo DO

## 2024-01-28 LAB
ANION GAP SERPL CALC-SCNC: 10 MMOL/L (ref 10–20)
BUN SERPL-MCNC: 16 MG/DL (ref 6–23)
CALCIUM SERPL-MCNC: 7.8 MG/DL (ref 8.6–10.3)
CHLORIDE SERPL-SCNC: 104 MMOL/L (ref 98–107)
CO2 SERPL-SCNC: 23 MMOL/L (ref 21–32)
CREAT SERPL-MCNC: 0.87 MG/DL (ref 0.5–1.3)
EGFRCR SERPLBLD CKD-EPI 2021: >90 ML/MIN/1.73M*2
ERYTHROCYTE [DISTWIDTH] IN BLOOD BY AUTOMATED COUNT: 20.7 % (ref 11.5–14.5)
ERYTHROCYTE [DISTWIDTH] IN BLOOD BY AUTOMATED COUNT: 20.8 % (ref 11.5–14.5)
GLUCOSE SERPL-MCNC: 96 MG/DL (ref 74–99)
HCT VFR BLD AUTO: 21.2 % (ref 41–52)
HCT VFR BLD AUTO: 22 % (ref 41–52)
HCT VFR BLD AUTO: 25 % (ref 41–52)
HGB BLD-MCNC: 6.3 G/DL (ref 13.5–17.5)
HGB BLD-MCNC: 6.4 G/DL (ref 13.5–17.5)
HGB BLD-MCNC: 7.5 G/DL (ref 13.5–17.5)
MAGNESIUM SERPL-MCNC: 1.51 MG/DL (ref 1.6–2.4)
MCH RBC QN AUTO: 25.5 PG (ref 26–34)
MCH RBC QN AUTO: 25.7 PG (ref 26–34)
MCHC RBC AUTO-ENTMCNC: 29.1 G/DL (ref 32–36)
MCHC RBC AUTO-ENTMCNC: 29.7 G/DL (ref 32–36)
MCV RBC AUTO: 87 FL (ref 80–100)
MCV RBC AUTO: 88 FL (ref 80–100)
NRBC BLD-RTO: 0 /100 WBCS (ref 0–0)
NRBC BLD-RTO: 0 /100 WBCS (ref 0–0)
PHOSPHATE SERPL-MCNC: 3.4 MG/DL (ref 2.5–4.9)
PLATELET # BLD AUTO: 59 X10*3/UL (ref 150–450)
PLATELET # BLD AUTO: 67 X10*3/UL (ref 150–450)
POTASSIUM SERPL-SCNC: 3.9 MMOL/L (ref 3.5–5.3)
RBC # BLD AUTO: 2.45 X10*6/UL (ref 4.5–5.9)
RBC # BLD AUTO: 2.51 X10*6/UL (ref 4.5–5.9)
SODIUM SERPL-SCNC: 133 MMOL/L (ref 136–145)
WBC # BLD AUTO: 6.5 X10*3/UL (ref 4.4–11.3)
WBC # BLD AUTO: 6.9 X10*3/UL (ref 4.4–11.3)

## 2024-01-28 PROCEDURE — 36415 COLL VENOUS BLD VENIPUNCTURE: CPT | Performed by: STUDENT IN AN ORGANIZED HEALTH CARE EDUCATION/TRAINING PROGRAM

## 2024-01-28 PROCEDURE — 82374 ASSAY BLOOD CARBON DIOXIDE: CPT | Performed by: STUDENT IN AN ORGANIZED HEALTH CARE EDUCATION/TRAINING PROGRAM

## 2024-01-28 PROCEDURE — 2500000004 HC RX 250 GENERAL PHARMACY W/ HCPCS (ALT 636 FOR OP/ED): Performed by: STUDENT IN AN ORGANIZED HEALTH CARE EDUCATION/TRAINING PROGRAM

## 2024-01-28 PROCEDURE — 85027 COMPLETE CBC AUTOMATED: CPT | Performed by: STUDENT IN AN ORGANIZED HEALTH CARE EDUCATION/TRAINING PROGRAM

## 2024-01-28 PROCEDURE — 85014 HEMATOCRIT: CPT | Performed by: STUDENT IN AN ORGANIZED HEALTH CARE EDUCATION/TRAINING PROGRAM

## 2024-01-28 PROCEDURE — P9016 RBC LEUKOCYTES REDUCED: HCPCS

## 2024-01-28 PROCEDURE — 84100 ASSAY OF PHOSPHORUS: CPT | Performed by: STUDENT IN AN ORGANIZED HEALTH CARE EDUCATION/TRAINING PROGRAM

## 2024-01-28 PROCEDURE — C9113 INJ PANTOPRAZOLE SODIUM, VIA: HCPCS | Performed by: STUDENT IN AN ORGANIZED HEALTH CARE EDUCATION/TRAINING PROGRAM

## 2024-01-28 PROCEDURE — 2500000004 HC RX 250 GENERAL PHARMACY W/ HCPCS (ALT 636 FOR OP/ED): Performed by: NURSE PRACTITIONER

## 2024-01-28 PROCEDURE — 83735 ASSAY OF MAGNESIUM: CPT | Performed by: STUDENT IN AN ORGANIZED HEALTH CARE EDUCATION/TRAINING PROGRAM

## 2024-01-28 PROCEDURE — 2500000001 HC RX 250 WO HCPCS SELF ADMINISTERED DRUGS (ALT 637 FOR MEDICARE OP): Performed by: STUDENT IN AN ORGANIZED HEALTH CARE EDUCATION/TRAINING PROGRAM

## 2024-01-28 PROCEDURE — 36430 TRANSFUSION BLD/BLD COMPNT: CPT

## 2024-01-28 PROCEDURE — 99233 SBSQ HOSP IP/OBS HIGH 50: CPT | Performed by: STUDENT IN AN ORGANIZED HEALTH CARE EDUCATION/TRAINING PROGRAM

## 2024-01-28 PROCEDURE — 1100000001 HC PRIVATE ROOM DAILY

## 2024-01-28 RX ORDER — LANOLIN ALCOHOL/MO/W.PET/CERES
400 CREAM (GRAM) TOPICAL DAILY
Status: DISCONTINUED | OUTPATIENT
Start: 2024-01-28 | End: 2024-01-29 | Stop reason: HOSPADM

## 2024-01-28 RX ORDER — POLYETHYLENE GLYCOL 3350 17 G/17G
17 POWDER, FOR SOLUTION ORAL DAILY
Status: DISCONTINUED | OUTPATIENT
Start: 2024-01-28 | End: 2024-01-29 | Stop reason: HOSPADM

## 2024-01-28 RX ORDER — ACETAMINOPHEN 325 MG/1
650 TABLET ORAL EVERY 4 HOURS PRN
Status: DISCONTINUED | OUTPATIENT
Start: 2024-01-28 | End: 2024-01-29 | Stop reason: HOSPADM

## 2024-01-28 RX ORDER — BISACODYL 5 MG
5 TABLET, DELAYED RELEASE (ENTERIC COATED) ORAL ONCE
Status: COMPLETED | OUTPATIENT
Start: 2024-01-28 | End: 2024-01-28

## 2024-01-28 RX ADMIN — POLYETHYLENE GLYCOL 3350 17 G: 17 POWDER, FOR SOLUTION ORAL at 13:58

## 2024-01-28 RX ADMIN — CEFTRIAXONE SODIUM 1 G: 1 INJECTION, SOLUTION INTRAVENOUS at 13:16

## 2024-01-28 RX ADMIN — BISACODYL 5 MG: 5 TABLET, COATED ORAL at 13:59

## 2024-01-28 RX ADMIN — PANTOPRAZOLE SODIUM 40 MG: 40 INJECTION, POWDER, FOR SOLUTION INTRAVENOUS at 09:40

## 2024-01-28 RX ADMIN — PANTOPRAZOLE SODIUM 40 MG: 40 INJECTION, POWDER, FOR SOLUTION INTRAVENOUS at 19:50

## 2024-01-28 RX ADMIN — Medication 400 MG: at 09:40

## 2024-01-28 RX ADMIN — OCTREOTIDE ACETATE 25 MCG/HR: 500 INJECTION, SOLUTION INTRAVENOUS; SUBCUTANEOUS at 02:39

## 2024-01-28 ASSESSMENT — COGNITIVE AND FUNCTIONAL STATUS - GENERAL
MOBILITY SCORE: 24
DAILY ACTIVITIY SCORE: 24
DAILY ACTIVITIY SCORE: 24
MOBILITY SCORE: 24

## 2024-01-28 ASSESSMENT — PAIN - FUNCTIONAL ASSESSMENT: PAIN_FUNCTIONAL_ASSESSMENT: 0-10

## 2024-01-28 ASSESSMENT — PAIN SCALES - GENERAL
PAINLEVEL_OUTOF10: 0 - NO PAIN
PAINLEVEL_OUTOF10: 0 - NO PAIN

## 2024-01-28 NOTE — NURSING NOTE
No acute events this shift.  Patient alert and oriented x 4.  Independent in room.  VS at 0000 showed a temp of 38.5. temp retaken at 0030 and was 37.5.  Patient denies any need for pain medication. Tolerating ostreotide gtt.  Patient calls for assist as needed.

## 2024-01-28 NOTE — PROGRESS NOTES
"Satinder Silver is a 63 y.o. male on day 3 of admission presenting with Gastrointestinal hemorrhage with hematemesis.    Subjective   Patient seen and examined at bedside.  Patient reports that he is doing okay but is feeling constipated. He states he feels like he has to go but can't. Is passing gas however. We discuss drop in Hg today and plan for transfusion. All questions answered.      Discussed with GI as well.     Objective     Physical Exam  Constitutional: Well developed, no distress, alert and cooperative  Skin: Warm and dry  Eyes: EOMI, clear sclera  ENMT: mucous membranes moist  Respiratory: Patent airways, CTAB  Cardiovascular: slightly tachy  Abdominal: Nondistended, soft, non-tender, +BS  MSK: ROM intact  Neuro: alert and oriented x3    Last Recorded Vitals  Blood pressure 133/68, pulse 95, temperature 36.9 °C (98.4 °F), temperature source Temporal, resp. rate 16, height 1.702 m (5' 7.01\"), weight 91.6 kg (201 lb 15.1 oz), SpO2 100 %.  Intake/Output last 3 Shifts:  I/O last 3 completed shifts:  In: 480 (5.2 mL/kg) [P.O.:480]  Out: 550 (6 mL/kg) [Urine:550 (0.2 mL/kg/hr)]  Weight: 91.6 kg     Relevant Results  Scheduled medications  cefTRIAXone, 1 g, intravenous, q24h  magnesium oxide, 400 mg, oral, Daily  pantoprazole, 40 mg, intravenous, BID  polyethylene glycol, 17 g, oral, Daily      Continuous medications       PRN medications  PRN medications: acetaminophen, potassium chloride  Results from last 7 days   Lab Units 01/28/24  0906 01/28/24  0526 01/27/24  1213   WBC AUTO x10*3/uL 6.9 6.5 7.9   RBC AUTO x10*6/uL 2.51* 2.45* 2.70*   HEMOGLOBIN g/dL 6.4* 6.3* 6.9*       Results from last 7 days   Lab Units 01/28/24  0526 01/27/24  1213 01/26/24  0430 01/25/24  1220   SODIUM mmol/L 133* 132* 139 137   POTASSIUM mmol/L 3.9 3.9 3.7 3.8   CHLORIDE mmol/L 104 103 108* 106   CO2 mmol/L 23 22 22 24   BUN mg/dL 16 23 36* 39*   CREATININE mg/dL 0.87 0.96 1.15 0.98   CALCIUM mg/dL 7.8* 8.0* 8.2* 8.4*   PHOSPHORUS " mg/dL 3.4 3.6 3.9  --    MAGNESIUM mg/dL 1.51* 1.62 1.50*  --    BILIRUBIN TOTAL mg/dL  --   --  2.6* 1.6*   ALT U/L  --   --  13 13   AST U/L  --   --  28 30         XR chest 1 view    Result Date: 1/26/2024  Interpreted By:  Jose Hahn, STUDY: XR CHEST 1 VIEW;  1/26/2024 8:54 am   INDICATION: Signs/Symptoms:rule out infectious process.   COMPARISON: None.   ACCESSION NUMBER(S): [id number]   ORDERING CLINICIAN: MAX ZIEGLER   FINDINGS: The lungs are clear without apparent pleural effusion. Unremarkable cardiomediastinal silhouette. No pulmonary vascular congestion.       No active disease in the chest.     Signed by: Jose Hahn 1/26/2024 9:16 AM Dictation workstation:   [id number]    EGD    Result Date: 1/25/2024  Table formatting from the original result was not included. Findings/Impression Grade II esophageal varices with red saul sign s/p 3 bands. Small hiatal hernia. Clotted blood in the stomach s/p suction clearance. The duodenum appeared normal (albeit edematous bulb). Recommendation  Octreotide gtt. PPI BID. Rule out infection. Antibiotics. Repeat EGD in 4 weeks. Rest per GI consult note.  Indication Gastrointestinal hemorrhage with hematemesis Medications See Anesthesia Record. Preprocedure A history and physical has been performed, and patient medication allergies have been reviewed. The patient's tolerance of previous anesthesia has been reviewed. The risks and benefits of the procedure and the sedation options and risks were discussed with the patient. All questions were answered and informed consent obtained. Details of the Procedure The patient underwent monitored anesthesia care, which was administered by an anesthesia professional. The patient's blood pressure, ECG, ETCO2, heart rate, level of consciousness, oxygen and respirations were monitored throughout the procedure. The scope was introduced through the mouth and advanced to the second part of the duodenum. Retroflexion was performed  in the cardia. The patient experienced no blood loss. The procedure was not difficult. The patient tolerated the procedure well. There were no apparent adverse events. Events Procedure Events Event Event Time ENDO SCOPE IN TIME 1/25/2024  3:56 PM ENDO SCOPE OUT TIME 1/25/2024  4:06 PM Specimens No specimens collected Procedure Location [facility] [address] [facility] [id number] [phone] Referring Provider Torie Soni, REINIER-CNP [address] [place] [address] Procedure Provider LUDWIN Avila     CT angio abdomen pelvis w and or wo IV IV contrast    Result Date: 1/25/2024  Interpreted By:  Ash Hernadez, STUDY: CT ANGIO ABDOMEN PELVIS W AND/OR WO IV IV CONTRAST;  1/25/2024 2:22 pm   INDICATION: Signs/Symptoms:UGIB, eval for arterial.   COMPARISON: None.   ACCESSION NUMBER(S): [id number]   ORDERING CLINICIAN: TINY SZYMANSKI   TECHNIQUE: Helical non-contrast images of the abdomen, and pelvis. Helical CT images of the abdomen and pelvis after the intravenous administration of 90 mL of Omnipaque 350 using CT angiographic technique with coronal and sagittal reformatted images.  MIP images were provided and reviewed. 3D reconstructions were performed on a separate independent workstation. Venous phase abdomen pelvis also performed, with axial coronal and sagittal images provided.   FINDINGS: VASCULAR:   GI: No areas of active bleeding are evident.   ABDOMINAL AORTA: No abdominal aortic aneurysm or dissection. No significant atherosclerosis.   ABDOMINAL AND PELVIC ARTERIES: No hemodynamically significant stenosis or occlusion. Incidental note is made of an accessory left hepatic artery arising from the left gastric artery.     LOWER CHEST:   HEART: Included portions of the heart is unremarkable without pericardial effusion.   LUNG BASES: No infiltrate or pleural effusion.   OSSEOUS STRUCTURES/CHEST WALL:  Degenerative changes of the spine. No acute osseous  abnormality.     ABDOMEN/PELVIS:   LIVER: The liver is heterogeneous in enhancement and demonstrates capsular nodularity. There is recanalization of the umbilical veins.   BILE DUCTS: No significant abnormality.   GALLBLADDER: No significant abnormality.   PANCREAS: No significant abnormality.   SPLEEN: The spleen is enlarged.   ADRENALS: No significant abnormality.   KIDNEYS, URETERS, BLADDER: Subcentimeter ovoid hypodensity in the anterior cortex of the left kidney, midpole. Probably a tiny cyst. There is no urolithiasis or hydroureteronephrosis. The urinary bladder is incompletely distended.   VESSELS: See above.  Are numerous esophageal varices evident. A few varices along the lesser curvature of the stomach is also noted.   LYMPH NODES: No enlarged lymph nodes.   RETROPERITONEUM:  No significant abnormality.   BOWEL: There is very mild stranding about the duodenal C-loop, suggesting mild duodenitis. There is scattered colonic diverticulosis without evidence of diverticulitis. There is no evidence of obstruction. Normal appendix.   PERITONEUM: No significant ascites, free air, or fluid collection.   REPRODUCTIVE ORGANS: No significant abnormality.   OSSEOUS STRUCTURES:  There are degenerative changes of the spine with facet arthropathy. No acute osseous abnormalities identified.   ABDOMINAL WALL: There is a tiny umbilical hernia containing fat and a small amount of fluid. This measures approximately 3.5 cm.       1.  No evidence of active GI bleed. No obvious source of GI bleeding is seen. There is mild stranding about the duodenal C-loop, suggestive of duodenitis.   2. Cirrhotic appearing liver with findings of portal hypertension including esophageal varices and a few varices about the lesser curvature of the stomach and in the upper abdomen.   3. Colonic diverticulosis without diverticulitis.   4. Splenomegaly.     Signed by: Ash Hernadez 1/25/2024 2:42 PM Dictation workstation:   [id number]        Assessment/Plan   Principal Problem:    Gastrointestinal hemorrhage with hematemesis  #Acute Blood Loss Anemia  #Liver cirrhosis  #Portal HTN  #Hematemesis  #Leukocytosis  #HTN  #Alcohol abuse    -Hg 4.1 on arrival   -received total of 4u pRBCs so far this stay   -Hg 6.3/6.4 today   -transfuse 1u pRBCs, repeat H&H at 6pm  -transfuse for Hg <7   -GI consulted   -CTA abdomen/pelvis with cirrhotic appearing liver w/ findings of portal HTN including esophageal varices and a few varices about the lesser curvature of the stomach and upper abd. Splenomegaly. Colonic diverticulosis w/o diverticulitis.   -s/p EGD on 1/25 with grade II varices s/p 3 bands  -completed 72 hrs of octreotide today  -continue PPI BID  -continue rocephin x7d   -continue home meds   -advance diet to low sodium   -DVT ppx: no pharmacologic due to bleeding, encourage ambulation    Dispo; This is a 64 y/o M admitted with severe anemia due to bleeding varices. GI consulted. Underwent EGD on 1/25 with variceal banding. Hg stable. Received 4u pRBCs total this stay. Hg 6.3 today, receiving 1u pRBCs. Completed 72 hrs of octreotide drip, continues to receive PPI BID and rocephin. GI aware. Anticipate discharge in 24-48hrs.       Roro Solo, DO

## 2024-01-28 NOTE — CARE PLAN
The patient's goals for the shift include  Patient will tolerate ostreotide infusion    The clinical goals for the shift include see care plan

## 2024-01-29 ENCOUNTER — APPOINTMENT (OUTPATIENT)
Dept: PRIMARY CARE | Facility: CLINIC | Age: 64
End: 2024-01-29
Payer: OTHER GOVERNMENT

## 2024-01-29 VITALS
RESPIRATION RATE: 16 BRPM | HEIGHT: 67 IN | DIASTOLIC BLOOD PRESSURE: 72 MMHG | TEMPERATURE: 97.9 F | SYSTOLIC BLOOD PRESSURE: 131 MMHG | OXYGEN SATURATION: 100 % | WEIGHT: 203.5 LBS | HEART RATE: 87 BPM | BODY MASS INDEX: 31.94 KG/M2

## 2024-01-29 PROBLEM — F10.10 ETOH ABUSE: Status: ACTIVE | Noted: 2024-01-29

## 2024-01-29 PROBLEM — D64.9 SYMPTOMATIC ANEMIA: Status: ACTIVE | Noted: 2024-01-29

## 2024-01-29 PROBLEM — I85.11: Status: ACTIVE | Noted: 2024-01-29

## 2024-01-29 LAB
ANION GAP SERPL CALC-SCNC: 10 MMOL/L (ref 10–20)
BLOOD EXPIRATION DATE: NORMAL
BLOOD EXPIRATION DATE: NORMAL
BUN SERPL-MCNC: 13 MG/DL (ref 6–23)
CALCIUM SERPL-MCNC: 8.1 MG/DL (ref 8.6–10.3)
CHLORIDE SERPL-SCNC: 105 MMOL/L (ref 98–107)
CO2 SERPL-SCNC: 23 MMOL/L (ref 21–32)
CREAT SERPL-MCNC: 0.82 MG/DL (ref 0.5–1.3)
DISPENSE STATUS: NORMAL
DISPENSE STATUS: NORMAL
EGFRCR SERPLBLD CKD-EPI 2021: >90 ML/MIN/1.73M*2
ERYTHROCYTE [DISTWIDTH] IN BLOOD BY AUTOMATED COUNT: 20.5 % (ref 11.5–14.5)
GLUCOSE SERPL-MCNC: 85 MG/DL (ref 74–99)
HCT VFR BLD AUTO: 24.9 % (ref 41–52)
HGB BLD-MCNC: 7.4 G/DL (ref 13.5–17.5)
MAGNESIUM SERPL-MCNC: 1.67 MG/DL (ref 1.6–2.4)
MCH RBC QN AUTO: 26 PG (ref 26–34)
MCHC RBC AUTO-ENTMCNC: 29.7 G/DL (ref 32–36)
MCV RBC AUTO: 87 FL (ref 80–100)
NRBC BLD-RTO: 0 /100 WBCS (ref 0–0)
PHOSPHATE SERPL-MCNC: 3.1 MG/DL (ref 2.5–4.9)
PLATELET # BLD AUTO: 61 X10*3/UL (ref 150–450)
POTASSIUM SERPL-SCNC: 4 MMOL/L (ref 3.5–5.3)
PRODUCT BLOOD TYPE: 6200
PRODUCT BLOOD TYPE: 6200
PRODUCT CODE: NORMAL
PRODUCT CODE: NORMAL
RBC # BLD AUTO: 2.85 X10*6/UL (ref 4.5–5.9)
SODIUM SERPL-SCNC: 134 MMOL/L (ref 136–145)
UNIT ABO: NORMAL
UNIT ABO: NORMAL
UNIT NUMBER: NORMAL
UNIT NUMBER: NORMAL
UNIT RH: NORMAL
UNIT RH: NORMAL
UNIT VOLUME: 350
UNIT VOLUME: 350
WBC # BLD AUTO: 6 X10*3/UL (ref 4.4–11.3)
XM INTEP: NORMAL
XM INTEP: NORMAL

## 2024-01-29 PROCEDURE — 80048 BASIC METABOLIC PNL TOTAL CA: CPT | Performed by: STUDENT IN AN ORGANIZED HEALTH CARE EDUCATION/TRAINING PROGRAM

## 2024-01-29 PROCEDURE — 99232 SBSQ HOSP IP/OBS MODERATE 35: CPT | Performed by: NURSE PRACTITIONER

## 2024-01-29 PROCEDURE — 84100 ASSAY OF PHOSPHORUS: CPT | Performed by: STUDENT IN AN ORGANIZED HEALTH CARE EDUCATION/TRAINING PROGRAM

## 2024-01-29 PROCEDURE — 2500000004 HC RX 250 GENERAL PHARMACY W/ HCPCS (ALT 636 FOR OP/ED): Performed by: STUDENT IN AN ORGANIZED HEALTH CARE EDUCATION/TRAINING PROGRAM

## 2024-01-29 PROCEDURE — 36415 COLL VENOUS BLD VENIPUNCTURE: CPT | Performed by: STUDENT IN AN ORGANIZED HEALTH CARE EDUCATION/TRAINING PROGRAM

## 2024-01-29 PROCEDURE — 85027 COMPLETE CBC AUTOMATED: CPT | Performed by: STUDENT IN AN ORGANIZED HEALTH CARE EDUCATION/TRAINING PROGRAM

## 2024-01-29 PROCEDURE — C9113 INJ PANTOPRAZOLE SODIUM, VIA: HCPCS | Performed by: STUDENT IN AN ORGANIZED HEALTH CARE EDUCATION/TRAINING PROGRAM

## 2024-01-29 PROCEDURE — 99239 HOSP IP/OBS DSCHRG MGMT >30: CPT | Performed by: STUDENT IN AN ORGANIZED HEALTH CARE EDUCATION/TRAINING PROGRAM

## 2024-01-29 PROCEDURE — 83735 ASSAY OF MAGNESIUM: CPT | Performed by: STUDENT IN AN ORGANIZED HEALTH CARE EDUCATION/TRAINING PROGRAM

## 2024-01-29 RX ORDER — POLYETHYLENE GLYCOL 3350 17 G/17G
17 POWDER, FOR SOLUTION ORAL DAILY
Qty: 30 PACKET | Refills: 0 | Status: SHIPPED | OUTPATIENT
Start: 2024-01-30 | End: 2024-02-29

## 2024-01-29 RX ORDER — PANTOPRAZOLE SODIUM 40 MG/1
40 TABLET, DELAYED RELEASE ORAL 2 TIMES DAILY
Qty: 60 TABLET | Refills: 0 | Status: SHIPPED | OUTPATIENT
Start: 2024-01-29 | End: 2024-01-29 | Stop reason: SDUPTHER

## 2024-01-29 RX ORDER — POLYETHYLENE GLYCOL 3350 17 G/17G
17 POWDER, FOR SOLUTION ORAL DAILY
Qty: 30 PACKET | Refills: 0 | Status: SHIPPED | OUTPATIENT
Start: 2024-01-30 | End: 2024-01-29 | Stop reason: SDUPTHER

## 2024-01-29 RX ORDER — CIPROFLOXACIN 500 MG/1
500 TABLET ORAL 2 TIMES DAILY
Qty: 8 TABLET | Refills: 0 | Status: SHIPPED | OUTPATIENT
Start: 2024-01-29 | End: 2024-01-29 | Stop reason: SDUPTHER

## 2024-01-29 RX ORDER — LANOLIN ALCOHOL/MO/W.PET/CERES
400 CREAM (GRAM) TOPICAL DAILY
Qty: 30 TABLET | Refills: 0 | Status: SHIPPED | OUTPATIENT
Start: 2024-01-30 | End: 2024-02-29

## 2024-01-29 RX ORDER — PANTOPRAZOLE SODIUM 40 MG/1
40 TABLET, DELAYED RELEASE ORAL 2 TIMES DAILY
Qty: 60 TABLET | Refills: 0 | Status: SHIPPED | OUTPATIENT
Start: 2024-01-29 | End: 2024-05-21 | Stop reason: ALTCHOICE

## 2024-01-29 RX ORDER — CIPROFLOXACIN 500 MG/1
500 TABLET ORAL 2 TIMES DAILY
Qty: 8 TABLET | Refills: 0 | Status: SHIPPED | OUTPATIENT
Start: 2024-01-29 | End: 2024-02-02

## 2024-01-29 RX ORDER — LANOLIN ALCOHOL/MO/W.PET/CERES
400 CREAM (GRAM) TOPICAL DAILY
Qty: 30 TABLET | Refills: 0 | Status: SHIPPED | OUTPATIENT
Start: 2024-01-30 | End: 2024-01-29 | Stop reason: SDUPTHER

## 2024-01-29 RX ADMIN — Medication 400 MG: at 08:55

## 2024-01-29 RX ADMIN — PANTOPRAZOLE SODIUM 40 MG: 40 INJECTION, POWDER, FOR SOLUTION INTRAVENOUS at 08:55

## 2024-01-29 RX ADMIN — POLYETHYLENE GLYCOL 3350 17 G: 17 POWDER, FOR SOLUTION ORAL at 08:55

## 2024-01-29 RX ADMIN — CEFTRIAXONE SODIUM 1 G: 1 INJECTION, SOLUTION INTRAVENOUS at 11:47

## 2024-01-29 SDOH — SOCIAL STABILITY: SOCIAL INSECURITY: WITHIN THE LAST YEAR, HAVE YOU BEEN AFRAID OF YOUR PARTNER OR EX-PARTNER?: NO

## 2024-01-29 SDOH — SOCIAL STABILITY: SOCIAL NETWORK: ARE YOU MARRIED, WIDOWED, DIVORCED, SEPARATED, NEVER MARRIED, OR LIVING WITH A PARTNER?: LIVING WITH PARTNER

## 2024-01-29 SDOH — HEALTH STABILITY: MENTAL HEALTH
STRESS IS WHEN SOMEONE FEELS TENSE, NERVOUS, ANXIOUS, OR CAN'T SLEEP AT NIGHT BECAUSE THEIR MIND IS TROUBLED. HOW STRESSED ARE YOU?: RATHER MUCH

## 2024-01-29 SDOH — SOCIAL STABILITY: SOCIAL NETWORK: HOW OFTEN DO YOU ATTENT MEETINGS OF THE CLUB OR ORGANIZATION YOU BELONG TO?: NEVER

## 2024-01-29 SDOH — SOCIAL STABILITY: SOCIAL NETWORK
DO YOU BELONG TO ANY CLUBS OR ORGANIZATIONS SUCH AS CHURCH GROUPS UNIONS, FRATERNAL OR ATHLETIC GROUPS, OR SCHOOL GROUPS?: NO

## 2024-01-29 SDOH — ECONOMIC STABILITY: FOOD INSECURITY: WITHIN THE PAST 12 MONTHS, THE FOOD YOU BOUGHT JUST DIDN'T LAST AND YOU DIDN'T HAVE MONEY TO GET MORE.: NEVER TRUE

## 2024-01-29 SDOH — SOCIAL STABILITY: SOCIAL INSECURITY
WITHIN THE LAST YEAR, HAVE YOU BEEN KICKED, HIT, SLAPPED, OR OTHERWISE PHYSICALLY HURT BY YOUR PARTNER OR EX-PARTNER?: NO

## 2024-01-29 SDOH — ECONOMIC STABILITY: INCOME INSECURITY: IN THE PAST 12 MONTHS, HAS THE ELECTRIC, GAS, OIL, OR WATER COMPANY THREATENED TO SHUT OFF SERVICE IN YOUR HOME?: NO

## 2024-01-29 SDOH — HEALTH STABILITY: PHYSICAL HEALTH: ON AVERAGE, HOW MANY DAYS PER WEEK DO YOU ENGAGE IN MODERATE TO STRENUOUS EXERCISE (LIKE A BRISK WALK)?: 0 DAYS

## 2024-01-29 SDOH — SOCIAL STABILITY: SOCIAL NETWORK: HOW OFTEN DO YOU ATTEND CHURCH OR RELIGIOUS SERVICES?: MORE THAN 4 TIMES PER YEAR

## 2024-01-29 SDOH — SOCIAL STABILITY: SOCIAL INSECURITY: WITHIN THE LAST YEAR, HAVE YOU BEEN HUMILIATED OR EMOTIONALLY ABUSED IN OTHER WAYS BY YOUR PARTNER OR EX-PARTNER?: NO

## 2024-01-29 SDOH — HEALTH STABILITY: PHYSICAL HEALTH: ON AVERAGE, HOW MANY MINUTES DO YOU ENGAGE IN EXERCISE AT THIS LEVEL?: 0 MIN

## 2024-01-29 SDOH — ECONOMIC STABILITY: FOOD INSECURITY: WITHIN THE PAST 12 MONTHS, YOU WORRIED THAT YOUR FOOD WOULD RUN OUT BEFORE YOU GOT MONEY TO BUY MORE.: NEVER TRUE

## 2024-01-29 SDOH — SOCIAL STABILITY: SOCIAL INSECURITY
WITHIN THE LAST YEAR, HAVE TO BEEN RAPED OR FORCED TO HAVE ANY KIND OF SEXUAL ACTIVITY BY YOUR PARTNER OR EX-PARTNER?: NO

## 2024-01-29 SDOH — SOCIAL STABILITY: SOCIAL NETWORK: IN A TYPICAL WEEK, HOW MANY TIMES DO YOU TALK ON THE PHONE WITH FAMILY, FRIENDS, OR NEIGHBORS?: THREE TIMES A WEEK

## 2024-01-29 SDOH — SOCIAL STABILITY: SOCIAL NETWORK: HOW OFTEN DO YOU GET TOGETHER WITH FRIENDS OR RELATIVES?: THREE TIMES A WEEK

## 2024-01-29 ASSESSMENT — PAIN SCALES - GENERAL: PAINLEVEL_OUTOF10: 0 - NO PAIN

## 2024-01-29 ASSESSMENT — PAIN - FUNCTIONAL ASSESSMENT: PAIN_FUNCTIONAL_ASSESSMENT: 0-10

## 2024-01-29 ASSESSMENT — COGNITIVE AND FUNCTIONAL STATUS - GENERAL
MOBILITY SCORE: 24
DAILY ACTIVITIY SCORE: 24

## 2024-01-29 NOTE — CARE PLAN
Problem: Skin  Goal: Prevent/minimize sheer/friction injuries  Outcome: Progressing  Goal: Promote/optimize nutrition  Outcome: Progressing     Problem: Pain  Goal: My pain/discomfort is manageable  Outcome: Progressing     Problem: Safety  Goal: Patient will be injury free during hospitalization  Outcome: Progressing     Problem: Daily Care  Goal: Daily care needs are met  Outcome: Progressing   The patient's goals for the shift include  get ready for discharge tomorrow morning    The clinical goals for the shift include patient Hg will remain stable throughout shift     Patient had an uneventful night, had no complaints expressed throughout the shift. Patient VSS WNL. No episodes of bloody stool/emesis. Patient independent in room, low falls risk. Patient safety maintained throughout shift.

## 2024-01-29 NOTE — DISCHARGE SUMMARY
Discharge Diagnosis  Gastrointestinal hemorrhage with hematemesis    Issues Requiring Follow-Up  Varices   GI bleeding   ETOH use/abuse   Cirrhosis     Discharge Meds     Your medication list        START taking these medications        Instructions Last Dose Given Next Dose Due   ciprofloxacin 500 mg tablet  Commonly known as: Cipro      Take 1 tablet (500 mg) by mouth 2 times a day for 4 days.       magnesium oxide 400 mg (241.3 mg magnesium) tablet  Commonly known as: Mag-Ox  Start taking on: January 30, 2024      Take 1 tablet (400 mg) by mouth once daily. Do not start before January 30, 2024.       pantoprazole 40 mg EC tablet  Commonly known as: ProtoNix      Take 1 tablet (40 mg) by mouth 2 times a day. Do not crush, chew, or split.       polyethylene glycol 17 gram packet  Commonly known as: Glycolax, Miralax  Start taking on: January 30, 2024      Take 17 g by mouth once daily. Do not start before January 30, 2024.              CONTINUE taking these medications        Instructions Last Dose Given Next Dose Due   lisinopril 20 mg tablet      Take 1 tablet (20 mg) by mouth once daily.              STOP taking these medications      clotrimazole 1 % cream  Commonly known as: Lotrimin                  Where to Get Your Medications        Information about where to get these medications is not yet available    Ask your nurse or doctor about these medications  ciprofloxacin 500 mg tablet  magnesium oxide 400 mg (241.3 mg magnesium) tablet  pantoprazole 40 mg EC tablet  polyethylene glycol 17 gram packet         Test Results Pending At Discharge  Pending Labs       No current pending labs.            Hospital Course   This is a 63-year-old male who presented with multiple episodes of bloody emesis as well as lightheadedness and dizziness found to have a hemoglobin of 4.1.  He was admitted to the ICU and GI was consulted.  He underwent an EGD on 1/25 and had banding of 3 esophageal varices.  He was placed on an  octreotide drip, PPI and Rocephin.  He was downgraded to RMF.   He received a total of 5 units PRBCs. He completed 72 hrs of octreotide drip.  He continued to do well and was determined to be medically stable for discharge home with instructions for outpatient labs and hepatology follow up.     D/c >30min    Pertinent Physical Exam At Time of Discharge  Physical Exam  Constitutional: Well developed, no distress, alert and cooperative  Skin: Warm and dry  Eyes: EOMI, clear sclera  ENMT: mucous membranes moist  Respiratory: Patent airways, CTAB  Cardiovascular: slightly tachy  Abdominal: Nondistended, soft, non-tender, +BS  MSK: ROM intact  Neuro: alert and oriented x3    Outpatient Follow-Up  Future Appointments   Date Time Provider Department Center   2/15/2024 10:20 AM DO [facility]1 Rolo Solo DO

## 2024-01-29 NOTE — DISCHARGE INSTRUCTIONS
-You will need repeat labs on Friday   -Please monitor for any new symptoms, including fatigue, weakness, bleeding, dizziness or other     -Please establish care with hepatology, please call to schedule   -Please follow up with your PCP in 7-10 days, please call to schedule    -Please stop drinking alcohol   -New prescriptions were sent to your pharmacy, please review your list carefully

## 2024-01-29 NOTE — DISCHARGE INSTR - DIET
- low-sodium diet as tolerated      - continue to monitor for overt signs of bleeding  - no NSAIDS- No advil, Ibuprofen   - continue ceftriaxone x 7 days total for variceal bleed  - continue PPI BID  - continue to encourage alcohol cessation    - pt will have repeat EGD in 4 weeks    - pt should have repeat CBC this Friday 2/2

## 2024-01-29 NOTE — PROGRESS NOTES
Spoke with patient in his room.  Plan is to return to his fiances home in [place].  He is under stress due to he owning a home in [place], Paying for a home in [place], Selling the home in [place], then will build a home in [place] and  In process of selling his mothers home in [place].  No home gong needs.

## 2024-01-29 NOTE — PROGRESS NOTES
"Subjective  Patient sitting up in chair in NAD.  Hemoglobin has been labile between 6.3 and 7.5.  He had 1 unit PRBCs yesterday though melena has slowly transitioned to more formed brown stool today.  No nausea, abdominal pain or cramping.  Tolerating diet.  Plan to continue alcohol cessation and management of new cirrhosis with outpatient hepatology care.    Objective  Blood pressure 131/72, pulse 87, temperature 36.6 °C (97.9 °F), temperature source Temporal, resp. rate 16, height 1.702 m (5' 7.01\"), weight 92.3 kg (203 lb 8 oz), SpO2 100 %.    Physical Exam  Constitutional: Alert, pleasant and interactive, in NAD  Eyes: PERRL, sclera clear, no conjunctival injection  Skin: Warm and dry, no rash or ecchymosis  ENMT: Mucous membranes moist, no lesions noted  Resp: CTAB, even and unlabored  CV: RRR, normal S1, S2, no m,r,g  GI: +BS, soft, round, NT, no rebound tenderness or guarding, no palpable masses or organomegaly  MSK: 5/5 strength, ROM intact, no joint swelling  Extremities: Extremities warm, no edema, contusions, wounds or cyanosis  Neuro: Alert and oriented x3  Psych: Appropriate mood and behavior    Medications  Scheduled medications  cefTRIAXone, 1 g, intravenous, q24h  magnesium oxide, 400 mg, oral, Daily  pantoprazole, 40 mg, intravenous, BID  polyethylene glycol, 17 g, oral, Daily      Continuous medications       PRN medications  PRN medications: acetaminophen, potassium chloride     Labs  Lab Results   Component Value Date    WBC 6.0 01/29/2024    HGB 7.4 (L) 01/29/2024    HCT 24.9 (L) 01/29/2024    MCV 87 01/29/2024    PLT 61 (L) 01/29/2024     Lab Results   Component Value Date    GLUCOSE 85 01/29/2024    CALCIUM 8.1 (L) 01/29/2024     (L) 01/29/2024    K 4.0 01/29/2024    CO2 23 01/29/2024     01/29/2024    BUN 13 01/29/2024    CREATININE 0.82 01/29/2024     Lab Results   Component Value Date    ALT 13 01/26/2024    AST 28 01/26/2024    ALKPHOS 82 01/26/2024    BILITOT 2.6 (H) " "01/26/2024     No results found for: \"IRON\", \"TIBC\", \"FERRITIN\"  Lab Results   Component Value Date    INR 1.4 (H) 01/26/2024    INR 1.6 (H) 01/25/2024    PROTIME 15.8 (H) 01/26/2024    PROTIME 18.4 (H) 01/25/2024     CT angio A/P with/without IV contrast 1/25/2024 noting:  IMPRESSION:  1.  No evidence of active GI bleed. No obvious source of GI bleeding  is seen. There is mild stranding about the duodenal C-loop,  suggestive of duodenitis.      2. Cirrhotic appearing liver with findings of portal hypertension  including esophageal varices and a few varices about the lesser  curvature of the stomach and in the upper abdomen.      3. Colonic diverticulosis without diverticulitis.      4. Splenomegaly.      Signed by: Ash Hernadez 1/25/2024 2:42 PM  Dictation workstation:   [id number]    Assessment:  Satinder Silver is a 63 y.o. male presenting with hematemesis.  PMH significant for HTN, heavy EtOH use.  Had 2 episodes of dark burgundy emesis prior to arrival.  No previous EGD.  No chronic NSAIDs, PPI or AC.  MELD Na+score 15.    Patient underwent EGD 1/25 noting grade II esophageal varices with red saul sign s/p 3 bands, small hiatal hernia, clotted blood in stomach s/p suction clearance.  Normal duodenal bulb.      Patient doing well this a.m.  Melena transitioned over to more formed brown stool.  No further signs of bleeding though he did require transfusion over the weekend.  Hemoglobin remaining stable at 7.4 today.  Patient completed 72 hours of octreotide.    # Grade II esophageal varices s/p banding x 3  # melena-no further episodes  # normocytic anemia-stable  # Decompensated EtOH cirrhosis  # Hiatal hernia     Plan:  - continue supportive care  - low-sodium diet as tolerated  - continue to trend hgb daily unless pt becomes symptomatic or decompensates  - transfuse to maintain hgb >7-avoid over transfusion as this will increase portal pressures and worsen bleeding tendency  - continue to monitor for overt " signs of bleeding  - no NSAIDS  - continue ceftriaxone x 7 days total for variceal bleed  - continue PPI BID  - continue to encourage alcohol cessation  - refer to hepatology for further management of cirrhosis at discharge- info placed in discharge follow up  - pt will have repeat EGD in 4 weeks  - pt ok for discharge when cleared by primary team  - pt should have repeat CBC this Friday 2/2    Plan has been discussed with Dr. Harris. GI will sign off.      Torie Soni, REINIER/CNP

## 2024-01-30 ENCOUNTER — PATIENT OUTREACH (OUTPATIENT)
Dept: CARE COORDINATION | Facility: CLINIC | Age: 64
End: 2024-01-30
Payer: OTHER GOVERNMENT

## 2024-01-30 DIAGNOSIS — I85.11 ESOPHAGEAL VARICES WITH BLEEDING IN DISEASES CLASSIFIED ELSEWHERE (MULTI): Primary | ICD-10-CM

## 2024-01-30 NOTE — PROGRESS NOTES
Discharge Facility: [facility]  Discharge Diagnosis: GI hemorrhage with hematemesis  Admission Date: 1/25/2024  Discharge Date: 1/29/2024    PCP Appointment Date:  -2/15/2024 1020    Specialist Appointment Date:   -Hepatology; pt to schedule appt  -Gastroenterology; pt to schedule appt  Pt requests referrals from PCP; will send message to Dr. Bhandari.    Hospital Encounter and Summary: Linked     See discharge assessment below for further details    Engagement  Call Start Time: 0955 (1/30/2024  9:55 AM)    Medications  Medications reviewed with patient/caregiver?: Yes (new prescriptions reviewed; ciprofloxacin, magnesium oxide, protonix) (1/30/2024  9:55 AM)  Is the patient having any side effects they believe may be caused by any medication additions or changes?: No (1/30/2024  9:55 AM)  Does the patient have all medications ordered at discharge?: Yes (1/30/2024  9:55 AM)  Care Management Interventions: No intervention needed (1/30/2024  9:55 AM)  Is the patient taking all medications as directed (includes completed medication regime)?: Yes (1/30/2024  9:55 AM)    Appointments  Does the patient have a primary care provider?: Yes (1/30/2024  9:55 AM)  Care Management Interventions: Verified appointment date/time/provider (1/30/2024  9:55 AM)  Has the patient kept scheduled appointments due by today?: Yes (1/30/2024  9:55 AM)    Self Management  Has home health visited the patient within 72 hours of discharge?: Not applicable (1/30/2024  9:55 AM)    Patient Teaching  Does the patient have access to their discharge instructions?: Yes (1/30/2024  9:55 AM)  Care Management Interventions: Reviewed instructions with patient (1/30/2024  9:55 AM)  What is the patient's perception of their health status since discharge?: Improving (1/30/2024  9:55 AM)  Is the patient/caregiver able to teach back the hierarchy of who to call/visit for symptoms/problems? PCP, Specialist, Home Health nurse, Urgent Care, ED, 911: Yes (1/30/2024   9:55 AM)    Wrap Up  Wrap Up Additional Comments: [facility] for GI hemorrhage with hematemesis. Pt reports feeling much better since discharge. Pt denies any bleeding since being home. Pt received 72 hours of octreotide gtt and 5 units of PRBC during hospitalization. Pt arrived to ED with a hemoglobin of 4.1; last hemoglobin 1/29/2024 7.4. On 1/25/2024 pt had EGD with banding of 3 esophageal varices. Pt aware he has repeat labwork ordered for Friday; CMP and CBC. Pt will also have labwork for PCP drawn. Pt dsicharged with prescriptions for ciprofloxacin, magnesium oxide, and protonix. Pt reports  no questions or issues with new medication. Pt to follow up with hepatology and gastroenterology; pt would like referrals from PCP; will notify Dr. Bhandari. Pt aware per discharge orders he was advised to have a repeat EGD in 4 weeks. Verified PCP appt 2/15/2024 1020; pt declines sooner appt at this time. Pt denies any other questions, needs, or concerns. He is encouraged to call if questions or needs arise. (1/30/2024  9:55 AM)  Call End Time: 1006 (1/30/2024  9:55 AM)

## 2024-01-31 LAB
BLOOD EXPIRATION DATE: NORMAL
DISPENSE STATUS: NORMAL
PRODUCT BLOOD TYPE: 6200
PRODUCT CODE: NORMAL
UNIT ABO: NORMAL
UNIT NUMBER: NORMAL
UNIT RH: NORMAL
UNIT VOLUME: 309

## 2024-02-01 ENCOUNTER — TELEPHONE (OUTPATIENT)
Dept: PRIMARY CARE | Facility: CLINIC | Age: 64
End: 2024-02-01
Payer: OTHER GOVERNMENT

## 2024-02-01 NOTE — TELEPHONE ENCOUNTER
Spoke with patient & notified these referrals are in chart. -MARLO    Called [phone] 2/1/23 @ 10:28am from reception line. -MARLO

## 2024-02-01 NOTE — TELEPHONE ENCOUNTER
Patient has an appt scheduled with GI due to a recent hospital stay.     [facility] is requiring a referral before patient can see, or follow up with GI in regards to his hospital stay.   Could you place this in the chart?     Please advise, Thanks!    Patient has an upcoming appt to see you 02/15/2023.

## 2024-02-02 ENCOUNTER — TELEPHONE (OUTPATIENT)
Dept: PRIMARY CARE | Facility: CLINIC | Age: 64
End: 2024-02-02

## 2024-02-02 ENCOUNTER — LAB (OUTPATIENT)
Dept: LAB | Facility: LAB | Age: 64
End: 2024-02-02
Payer: OTHER GOVERNMENT

## 2024-02-02 DIAGNOSIS — D64.9 ANEMIA, UNSPECIFIED TYPE: Primary | ICD-10-CM

## 2024-02-02 DIAGNOSIS — Z12.5 SCREENING FOR PROSTATE CANCER: ICD-10-CM

## 2024-02-02 DIAGNOSIS — K92.0 GASTROINTESTINAL HEMORRHAGE WITH HEMATEMESIS: ICD-10-CM

## 2024-02-02 DIAGNOSIS — I10 HYPERTENSION, UNSPECIFIED TYPE: ICD-10-CM

## 2024-02-02 LAB
ALBUMIN SERPL BCP-MCNC: 3.2 G/DL (ref 3.4–5)
ALP SERPL-CCNC: 112 U/L (ref 33–136)
ALT SERPL W P-5'-P-CCNC: 16 U/L (ref 10–52)
ANION GAP SERPL CALC-SCNC: 12 MMOL/L (ref 10–20)
AST SERPL W P-5'-P-CCNC: 32 U/L (ref 9–39)
BILIRUB SERPL-MCNC: 1.3 MG/DL (ref 0–1.2)
BUN SERPL-MCNC: 9 MG/DL (ref 6–23)
CALCIUM SERPL-MCNC: 8.3 MG/DL (ref 8.6–10.3)
CHLORIDE SERPL-SCNC: 105 MMOL/L (ref 98–107)
CO2 SERPL-SCNC: 24 MMOL/L (ref 21–32)
CREAT SERPL-MCNC: 0.74 MG/DL (ref 0.5–1.3)
EGFRCR SERPLBLD CKD-EPI 2021: >90 ML/MIN/1.73M*2
ERYTHROCYTE [DISTWIDTH] IN BLOOD BY AUTOMATED COUNT: 20.5 % (ref 11.5–14.5)
GLUCOSE SERPL-MCNC: 101 MG/DL (ref 74–99)
HCT VFR BLD AUTO: 25.2 % (ref 41–52)
HGB BLD-MCNC: 7.3 G/DL (ref 13.5–17.5)
MAGNESIUM SERPL-MCNC: 1.63 MG/DL (ref 1.6–2.4)
MCH RBC QN AUTO: 25.6 PG (ref 26–34)
MCHC RBC AUTO-ENTMCNC: 29 G/DL (ref 32–36)
MCV RBC AUTO: 88 FL (ref 80–100)
NRBC BLD-RTO: 0 /100 WBCS (ref 0–0)
PLATELET # BLD AUTO: 109 X10*3/UL (ref 150–450)
POTASSIUM SERPL-SCNC: 4 MMOL/L (ref 3.5–5.3)
PROT SERPL-MCNC: 6 G/DL (ref 6.4–8.2)
PSA SERPL-MCNC: 0.63 NG/ML
RBC # BLD AUTO: 2.85 X10*6/UL (ref 4.5–5.9)
SODIUM SERPL-SCNC: 137 MMOL/L (ref 136–145)
T4 FREE SERPL-MCNC: 1.02 NG/DL (ref 0.61–1.12)
TSH SERPL-ACNC: 5.22 MIU/L (ref 0.44–3.98)
VIT B12 SERPL-MCNC: 497 PG/ML (ref 211–911)
WBC # BLD AUTO: 6.6 X10*3/UL (ref 4.4–11.3)

## 2024-02-02 PROCEDURE — 80053 COMPREHEN METABOLIC PANEL: CPT

## 2024-02-02 PROCEDURE — 82607 VITAMIN B-12: CPT

## 2024-02-02 PROCEDURE — 84443 ASSAY THYROID STIM HORMONE: CPT

## 2024-02-02 PROCEDURE — 85027 COMPLETE CBC AUTOMATED: CPT

## 2024-02-02 PROCEDURE — 36415 COLL VENOUS BLD VENIPUNCTURE: CPT

## 2024-02-02 PROCEDURE — 84153 ASSAY OF PSA TOTAL: CPT

## 2024-02-02 PROCEDURE — 84439 ASSAY OF FREE THYROXINE: CPT

## 2024-02-02 PROCEDURE — 83735 ASSAY OF MAGNESIUM: CPT

## 2024-02-02 RX ORDER — FERROUS SULFATE 325(65) MG
1 TABLET ORAL
Qty: 30 TABLET | Refills: 3 | Status: SHIPPED | OUTPATIENT
Start: 2024-02-02 | End: 2024-06-11

## 2024-02-02 NOTE — TELEPHONE ENCOUNTER
I spoke to him briefly over the phone.  He was recently discharged from the hospital for anemia and esophageal varices.  Prior to hospitalization he had presented with bloody emesis and was found to have a hemoglobin of 4.1.  He saw GI and had an EGD and had banding of 3 esophageal varices.  He was transfused a total of 5 units PRBCs.  Hemoglobin on discharge was 7.4  His hemoglobin today is 7.3.  He is not currently taking any iron tablets, so we will start FeSO4 325 mg daily.  Recommended getting CBC rechecked on Thursday or Friday of next week.  He is going to be following up with both gastroenterology and hepatology.  He tells me that he is feeling better since discharge from hospital

## 2024-02-09 ENCOUNTER — LAB (OUTPATIENT)
Dept: LAB | Facility: LAB | Age: 64
End: 2024-02-09
Payer: OTHER GOVERNMENT

## 2024-02-09 DIAGNOSIS — D64.9 ANEMIA, UNSPECIFIED TYPE: ICD-10-CM

## 2024-02-09 LAB
BASOPHILS # BLD AUTO: 0.08 X10*3/UL (ref 0–0.1)
BASOPHILS NFR BLD AUTO: 1.1 %
EOSINOPHIL # BLD AUTO: 0.22 X10*3/UL (ref 0–0.7)
EOSINOPHIL NFR BLD AUTO: 3 %
ERYTHROCYTE [DISTWIDTH] IN BLOOD BY AUTOMATED COUNT: 21.2 % (ref 11.5–14.5)
HCT VFR BLD AUTO: 28.8 % (ref 41–52)
HGB BLD-MCNC: 8.1 G/DL (ref 13.5–17.5)
IMM GRANULOCYTES # BLD AUTO: 0.03 X10*3/UL (ref 0–0.7)
IMM GRANULOCYTES NFR BLD AUTO: 0.4 % (ref 0–0.9)
LYMPHOCYTES # BLD AUTO: 1.1 X10*3/UL (ref 1.2–4.8)
LYMPHOCYTES NFR BLD AUTO: 14.9 %
MCH RBC QN AUTO: 24.9 PG (ref 26–34)
MCHC RBC AUTO-ENTMCNC: 28.1 G/DL (ref 32–36)
MCV RBC AUTO: 89 FL (ref 80–100)
MONOCYTES # BLD AUTO: 0.71 X10*3/UL (ref 0.1–1)
MONOCYTES NFR BLD AUTO: 9.6 %
NEUTROPHILS # BLD AUTO: 5.22 X10*3/UL (ref 1.2–7.7)
NEUTROPHILS NFR BLD AUTO: 71 %
NRBC BLD-RTO: 0 /100 WBCS (ref 0–0)
OVALOCYTES BLD QL SMEAR: NORMAL
PLATELET # BLD AUTO: 167 X10*3/UL (ref 150–450)
POLYCHROMASIA BLD QL SMEAR: NORMAL
RBC # BLD AUTO: 3.25 X10*6/UL (ref 4.5–5.9)
RBC MORPH BLD: NORMAL
SCHISTOCYTES BLD QL SMEAR: NORMAL
TARGETS BLD QL SMEAR: NORMAL
WBC # BLD AUTO: 7.4 X10*3/UL (ref 4.4–11.3)

## 2024-02-09 PROCEDURE — 85025 COMPLETE CBC W/AUTO DIFF WBC: CPT

## 2024-02-09 PROCEDURE — 36415 COLL VENOUS BLD VENIPUNCTURE: CPT

## 2024-02-15 ENCOUNTER — OFFICE VISIT (OUTPATIENT)
Dept: PRIMARY CARE | Facility: CLINIC | Age: 64
End: 2024-02-15
Payer: OTHER GOVERNMENT

## 2024-02-15 VITALS
DIASTOLIC BLOOD PRESSURE: 74 MMHG | SYSTOLIC BLOOD PRESSURE: 130 MMHG | BODY MASS INDEX: 32.65 KG/M2 | WEIGHT: 208 LBS | HEIGHT: 67 IN | OXYGEN SATURATION: 98 % | HEART RATE: 106 BPM

## 2024-02-15 DIAGNOSIS — I85.11 ESOPHAGEAL VARICES WITH BLEEDING IN DISEASES CLASSIFIED ELSEWHERE (MULTI): ICD-10-CM

## 2024-02-15 DIAGNOSIS — Z00.00 ROUTINE HEALTH MAINTENANCE: Primary | ICD-10-CM

## 2024-02-15 DIAGNOSIS — R79.89 ABNORMAL TSH: ICD-10-CM

## 2024-02-15 DIAGNOSIS — K74.60 CIRRHOSIS OF LIVER WITHOUT ASCITES, UNSPECIFIED HEPATIC CIRRHOSIS TYPE (MULTI): ICD-10-CM

## 2024-02-15 DIAGNOSIS — D64.9 ANEMIA, UNSPECIFIED TYPE: ICD-10-CM

## 2024-02-15 PROCEDURE — 3078F DIAST BP <80 MM HG: CPT | Performed by: FAMILY MEDICINE

## 2024-02-15 PROCEDURE — 90715 TDAP VACCINE 7 YRS/> IM: CPT | Performed by: FAMILY MEDICINE

## 2024-02-15 PROCEDURE — 90472 IMMUNIZATION ADMIN EACH ADD: CPT | Performed by: FAMILY MEDICINE

## 2024-02-15 PROCEDURE — 90471 IMMUNIZATION ADMIN: CPT | Performed by: FAMILY MEDICINE

## 2024-02-15 PROCEDURE — 90750 HZV VACC RECOMBINANT IM: CPT | Performed by: FAMILY MEDICINE

## 2024-02-15 PROCEDURE — 3075F SYST BP GE 130 - 139MM HG: CPT | Performed by: FAMILY MEDICINE

## 2024-02-15 PROCEDURE — 1036F TOBACCO NON-USER: CPT | Performed by: FAMILY MEDICINE

## 2024-02-15 PROCEDURE — 99396 PREV VISIT EST AGE 40-64: CPT | Performed by: FAMILY MEDICINE

## 2024-02-15 ASSESSMENT — ENCOUNTER SYMPTOMS
ABDOMINAL PAIN: 0
COUGH: 0
FATIGUE: 1
FEVER: 0
DIZZINESS: 1

## 2024-02-15 NOTE — PROGRESS NOTES
"Subjective   Patient ID: Satinder Silver is a 63 y.o. male who presents for Annual Exam.    HPI       Here today for annual physical and follow-up on hospitalization  He was hospitalized 1/25 through 1/29/2024 with anemia and esophageal varices  Prior to hospitalization he presented with multiple episodes of bloody emesis and lightheadedness/dizziness.  He was found to have hemoglobin decreased at 4.1  GI was consulted and he underwent an EGD and had banding of 3 esophageal varices.  He received a total of 5 units PRBCs.  CT scan showed a cirrhotic appearing liver with findings of portal hypertension  He was discharged home with pantoprazole, as well as recommendations for a follow-up EGD in 3 to 4 weeks, and also to follow-up with hepatology  His hemoglobin on discharge was 7.4.  He has been taking ferrous sulfate also, and most recent hemoglobin done on 2/9 improved to 8.1  He has been feeling better since discharge.  Denies any abdominal pain or nausea.  He is scheduled to have a follow-up EGD on 3/4 and will be seeing hepatology in May  He has not had any alcohol since discharge from the hospital.  He mentions that he had been starting to drink more more ever since 2005.  Prior to hospitalization he was sharing a few boxes of wine every 3 days.  He does not smoke  Reports that last colonoscopy was 2021.  Repeat 5 years recommended he is taking lisinopril 20 mg for hypertension, but does not take this medication every day.  Blood pressure averages 130 systolic when checking out of the office        Review of Systems   Constitutional:  Positive for fatigue. Negative for fever.   Respiratory:  Negative for cough.    Cardiovascular:  Negative for chest pain.   Gastrointestinal:  Negative for abdominal pain.   Neurological:  Positive for dizziness.   All other systems reviewed and are negative.      Objective   /74   Pulse 106   Ht 1.702 m (5' 7\")   Wt 94.3 kg (208 lb)   SpO2 98%   BMI 32.58 kg/m² "     Physical Exam  Vitals reviewed.   Constitutional:       General: He is not in acute distress.     Appearance: Normal appearance. He is well-developed.   HENT:      Head: Normocephalic.      Right Ear: Tympanic membrane, ear canal and external ear normal.      Left Ear: Tympanic membrane, ear canal and external ear normal.      Nose: Nose normal.      Mouth/Throat:      Mouth: Mucous membranes are moist.   Eyes:      Conjunctiva/sclera: Conjunctivae normal.   Neck:      Thyroid: No thyromegaly.      Vascular: No JVD.   Cardiovascular:      Rate and Rhythm: Normal rate and regular rhythm.      Heart sounds: Normal heart sounds.   Pulmonary:      Effort: Pulmonary effort is normal.      Breath sounds: Normal breath sounds.   Abdominal:      Palpations: Abdomen is soft.      Tenderness: There is no abdominal tenderness.   Musculoskeletal:         General: Normal range of motion.   Lymphadenopathy:      Cervical: No cervical adenopathy.   Skin:     Findings: No rash.   Neurological:      Mental Status: He is alert and oriented to person, place, and time.   Psychiatric:         Mood and Affect: Mood normal.         Behavior: Behavior normal.         Assessment/Plan   Problem List Items Addressed This Visit    None  Visit Diagnoses       Anemia, unspecified type    -  Primary    Relevant Orders    CBC and Auto Differential    Hepatitis Panel, Acute    Abnormal TSH        Relevant Orders    TSH with reflex to Free T4 if abnormal        #1 preventative health  Up-to-date on screening colonoscopy.  He is going to drop off a copy of his most recent colonoscopy from 2021 for our records  Prostate cancer screening: Had normal PSA 2/2/2024  Given tetanus and first Shingrix vaccines today.  Will plan on administering his second Shingrix vaccination in 2 to 6 months, and also consider administering Prevnar 20 vaccine at that time  Given his recent diagnosis of cirrhosis, I also recommended that he check his vaccine record at  home to ensure that he is up-to-date on hepatitis A and hepatitis B vaccinations  Hypertension: This has been stable on lisinopril, continue current dose  He has been feeling well following discharge from hospital, and his hemoglobin has been increasing appropriately, and most recently was 8.1.  Continue ferrous sulfate and we will plan on checking his CBC again in 1 week.  Discussed that the importance of continued alcohol avoidance  He will be having a follow-up EGD in the next 2 to 3 weeks, and he will be following up with hepatology in the next several months  Continue pantoprazole  I could not locate a recent viral hepatitis panel, so we will check this with next labs also  We reviewed labs from 2/2 which showed slightly elevated TSH at 5.22.  Normal free T4.  We will recheck this in 2 months  Follow-up in 3 months for recheck

## 2024-02-16 ENCOUNTER — PATIENT OUTREACH (OUTPATIENT)
Dept: CARE COORDINATION | Facility: CLINIC | Age: 64
End: 2024-02-16
Payer: OTHER GOVERNMENT

## 2024-02-16 DIAGNOSIS — D64.9 ANEMIA, UNSPECIFIED TYPE: Primary | ICD-10-CM

## 2024-02-23 ENCOUNTER — LAB (OUTPATIENT)
Dept: LAB | Facility: LAB | Age: 64
End: 2024-02-23
Payer: OTHER GOVERNMENT

## 2024-02-23 DIAGNOSIS — D64.9 ANEMIA, UNSPECIFIED TYPE: ICD-10-CM

## 2024-02-23 LAB
ACANTHOCYTES BLD QL SMEAR: NORMAL
BASOPHILS # BLD AUTO: 0.04 X10*3/UL (ref 0–0.1)
BASOPHILS NFR BLD AUTO: 0.6 %
DACRYOCYTES BLD QL SMEAR: NORMAL
EOSINOPHIL # BLD AUTO: 0.21 X10*3/UL (ref 0–0.7)
EOSINOPHIL NFR BLD AUTO: 3.1 %
ERYTHROCYTE [DISTWIDTH] IN BLOOD BY AUTOMATED COUNT: 24 % (ref 11.5–14.5)
HAV IGM SER QL: NONREACTIVE
HBV CORE IGM SER QL: NONREACTIVE
HBV SURFACE AG SERPL QL IA: NONREACTIVE
HCT VFR BLD AUTO: 32.5 % (ref 41–52)
HCV AB SER QL: NONREACTIVE
HGB BLD-MCNC: 9.5 G/DL (ref 13.5–17.5)
IMM GRANULOCYTES # BLD AUTO: 0.03 X10*3/UL (ref 0–0.7)
IMM GRANULOCYTES NFR BLD AUTO: 0.4 % (ref 0–0.9)
LYMPHOCYTES # BLD AUTO: 0.92 X10*3/UL (ref 1.2–4.8)
LYMPHOCYTES NFR BLD AUTO: 13.7 %
MCH RBC QN AUTO: 26.5 PG (ref 26–34)
MCHC RBC AUTO-ENTMCNC: 29.2 G/DL (ref 32–36)
MCV RBC AUTO: 91 FL (ref 80–100)
MONOCYTES # BLD AUTO: 0.72 X10*3/UL (ref 0.1–1)
MONOCYTES NFR BLD AUTO: 10.7 %
NEUTROPHILS # BLD AUTO: 4.78 X10*3/UL (ref 1.2–7.7)
NEUTROPHILS NFR BLD AUTO: 71.5 %
NRBC BLD-RTO: 0 /100 WBCS (ref 0–0)
OVALOCYTES BLD QL SMEAR: NORMAL
PLATELET # BLD AUTO: 99 X10*3/UL (ref 150–450)
POLYCHROMASIA BLD QL SMEAR: NORMAL
RBC # BLD AUTO: 3.58 X10*6/UL (ref 4.5–5.9)
RBC MORPH BLD: NORMAL
SCHISTOCYTES BLD QL SMEAR: NORMAL
WBC # BLD AUTO: 6.7 X10*3/UL (ref 4.4–11.3)

## 2024-02-23 PROCEDURE — 80074 ACUTE HEPATITIS PANEL: CPT

## 2024-02-23 PROCEDURE — 85025 COMPLETE CBC W/AUTO DIFF WBC: CPT

## 2024-03-04 ENCOUNTER — APPOINTMENT (OUTPATIENT)
Dept: GASTROENTEROLOGY | Facility: CLINIC | Age: 64
End: 2024-03-04
Payer: OTHER GOVERNMENT

## 2024-03-08 ENCOUNTER — HOSPITAL ENCOUNTER (OUTPATIENT)
Dept: GASTROENTEROLOGY | Facility: HOSPITAL | Age: 64
Discharge: HOME | End: 2024-03-08
Payer: OTHER GOVERNMENT

## 2024-03-08 ENCOUNTER — ANESTHESIA (OUTPATIENT)
Dept: GASTROENTEROLOGY | Facility: HOSPITAL | Age: 64
End: 2024-03-08
Payer: OTHER GOVERNMENT

## 2024-03-08 ENCOUNTER — ANESTHESIA EVENT (OUTPATIENT)
Dept: GASTROENTEROLOGY | Facility: HOSPITAL | Age: 64
End: 2024-03-08
Payer: OTHER GOVERNMENT

## 2024-03-08 ENCOUNTER — TELEPHONE (OUTPATIENT)
Dept: PRIMARY CARE | Facility: CLINIC | Age: 64
End: 2024-03-08

## 2024-03-08 ENCOUNTER — LAB (OUTPATIENT)
Dept: LAB | Facility: LAB | Age: 64
End: 2024-03-08
Payer: OTHER GOVERNMENT

## 2024-03-08 VITALS
OXYGEN SATURATION: 89 % | RESPIRATION RATE: 18 BRPM | WEIGHT: 194.22 LBS | BODY MASS INDEX: 30.48 KG/M2 | SYSTOLIC BLOOD PRESSURE: 121 MMHG | HEART RATE: 88 BPM | TEMPERATURE: 97 F | HEIGHT: 67 IN | DIASTOLIC BLOOD PRESSURE: 76 MMHG

## 2024-03-08 DIAGNOSIS — I85.11 ESOPHAGEAL VARICES WITH BLEEDING IN DISEASES CLASSIFIED ELSEWHERE (MULTI): ICD-10-CM

## 2024-03-08 DIAGNOSIS — D64.9 ANEMIA, UNSPECIFIED TYPE: Primary | ICD-10-CM

## 2024-03-08 DIAGNOSIS — D64.9 ANEMIA, UNSPECIFIED TYPE: ICD-10-CM

## 2024-03-08 PROBLEM — E66.811 CLASS 1 OBESITY DUE TO EXCESS CALORIES WITH BODY MASS INDEX (BMI) OF 30.0 TO 30.9 IN ADULT: Status: ACTIVE | Noted: 2024-03-08

## 2024-03-08 PROBLEM — E66.09 CLASS 1 OBESITY DUE TO EXCESS CALORIES WITH BODY MASS INDEX (BMI) OF 30.0 TO 30.9 IN ADULT: Status: ACTIVE | Noted: 2024-03-08

## 2024-03-08 LAB
BASOPHILS # BLD AUTO: 0.05 X10*3/UL (ref 0–0.1)
BASOPHILS NFR BLD AUTO: 1 %
DACRYOCYTES BLD QL SMEAR: NORMAL
EOSINOPHIL # BLD AUTO: 0.19 X10*3/UL (ref 0–0.7)
EOSINOPHIL NFR BLD AUTO: 3.8 %
ERYTHROCYTE [DISTWIDTH] IN BLOOD BY AUTOMATED COUNT: 23.3 % (ref 11.5–14.5)
HCT VFR BLD AUTO: 34.8 % (ref 41–52)
HGB BLD-MCNC: 10.5 G/DL (ref 13.5–17.5)
IMM GRANULOCYTES # BLD AUTO: 0.01 X10*3/UL (ref 0–0.7)
IMM GRANULOCYTES NFR BLD AUTO: 0.2 % (ref 0–0.9)
LYMPHOCYTES # BLD AUTO: 0.98 X10*3/UL (ref 1.2–4.8)
LYMPHOCYTES NFR BLD AUTO: 19.8 %
MCH RBC QN AUTO: 27.9 PG (ref 26–34)
MCHC RBC AUTO-ENTMCNC: 30.2 G/DL (ref 32–36)
MCV RBC AUTO: 93 FL (ref 80–100)
MONOCYTES # BLD AUTO: 0.5 X10*3/UL (ref 0.1–1)
MONOCYTES NFR BLD AUTO: 10.1 %
NEUTROPHILS # BLD AUTO: 3.22 X10*3/UL (ref 1.2–7.7)
NEUTROPHILS NFR BLD AUTO: 65.1 %
NRBC BLD-RTO: 0 /100 WBCS (ref 0–0)
OVALOCYTES BLD QL SMEAR: NORMAL
PLATELET # BLD AUTO: 95 X10*3/UL (ref 150–450)
RBC # BLD AUTO: 3.76 X10*6/UL (ref 4.5–5.9)
RBC MORPH BLD: NORMAL
WBC # BLD AUTO: 5 X10*3/UL (ref 4.4–11.3)

## 2024-03-08 PROCEDURE — 3700000002 HC GENERAL ANESTHESIA TIME - EACH INCREMENTAL 1 MINUTE

## 2024-03-08 PROCEDURE — 7100000009 HC PHASE TWO TIME - INITIAL BASE CHARGE

## 2024-03-08 PROCEDURE — 2720000007 HC OR 272 NO HCPCS

## 2024-03-08 PROCEDURE — 36415 COLL VENOUS BLD VENIPUNCTURE: CPT

## 2024-03-08 PROCEDURE — 43239 EGD BIOPSY SINGLE/MULTIPLE: CPT | Performed by: STUDENT IN AN ORGANIZED HEALTH CARE EDUCATION/TRAINING PROGRAM

## 2024-03-08 PROCEDURE — 2500000004 HC RX 250 GENERAL PHARMACY W/ HCPCS (ALT 636 FOR OP/ED): Performed by: STUDENT IN AN ORGANIZED HEALTH CARE EDUCATION/TRAINING PROGRAM

## 2024-03-08 PROCEDURE — 0753T DGTZ GLS MCRSCP SLD LEVEL IV: CPT | Mod: TC,ELYLAB | Performed by: STUDENT IN AN ORGANIZED HEALTH CARE EDUCATION/TRAINING PROGRAM

## 2024-03-08 PROCEDURE — 85025 COMPLETE CBC W/AUTO DIFF WBC: CPT

## 2024-03-08 PROCEDURE — 88305 TISSUE EXAM BY PATHOLOGIST: CPT | Performed by: PATHOLOGY

## 2024-03-08 PROCEDURE — 3700000001 HC GENERAL ANESTHESIA TIME - INITIAL BASE CHARGE

## 2024-03-08 PROCEDURE — 7100000010 HC PHASE TWO TIME - EACH INCREMENTAL 1 MINUTE

## 2024-03-08 PROCEDURE — 2500000004 HC RX 250 GENERAL PHARMACY W/ HCPCS (ALT 636 FOR OP/ED): Performed by: ANESTHESIOLOGY

## 2024-03-08 PROCEDURE — 43244 EGD VARICES LIGATION: CPT | Performed by: STUDENT IN AN ORGANIZED HEALTH CARE EDUCATION/TRAINING PROGRAM

## 2024-03-08 RX ORDER — PROPOFOL 10 MG/ML
INJECTION, EMULSION INTRAVENOUS AS NEEDED
Status: DISCONTINUED | OUTPATIENT
Start: 2024-03-08 | End: 2024-03-08

## 2024-03-08 RX ORDER — SODIUM CHLORIDE, SODIUM LACTATE, POTASSIUM CHLORIDE, CALCIUM CHLORIDE 600; 310; 30; 20 MG/100ML; MG/100ML; MG/100ML; MG/100ML
20 INJECTION, SOLUTION INTRAVENOUS CONTINUOUS
Status: DISCONTINUED | OUTPATIENT
Start: 2024-03-08 | End: 2024-03-09 | Stop reason: HOSPADM

## 2024-03-08 RX ORDER — PROPOFOL 10 MG/ML
INJECTION, EMULSION INTRAVENOUS CONTINUOUS PRN
Status: DISCONTINUED | OUTPATIENT
Start: 2024-03-08 | End: 2024-03-08

## 2024-03-08 RX ADMIN — PROPOFOL 70 MG: 10 INJECTION, EMULSION INTRAVENOUS at 12:15

## 2024-03-08 RX ADMIN — PROPOFOL 30 MG: 10 INJECTION, EMULSION INTRAVENOUS at 12:23

## 2024-03-08 RX ADMIN — PROPOFOL 100 MCG/KG/MIN: 10 INJECTION, EMULSION INTRAVENOUS at 12:15

## 2024-03-08 RX ADMIN — SODIUM CHLORIDE, POTASSIUM CHLORIDE, SODIUM LACTATE AND CALCIUM CHLORIDE 20 ML/HR: 600; 310; 30; 20 INJECTION, SOLUTION INTRAVENOUS at 11:05

## 2024-03-08 SDOH — HEALTH STABILITY: MENTAL HEALTH: CURRENT SMOKER: 0

## 2024-03-08 ASSESSMENT — COLUMBIA-SUICIDE SEVERITY RATING SCALE - C-SSRS
2. HAVE YOU ACTUALLY HAD ANY THOUGHTS OF KILLING YOURSELF?: NO
1. IN THE PAST MONTH, HAVE YOU WISHED YOU WERE DEAD OR WISHED YOU COULD GO TO SLEEP AND NOT WAKE UP?: NO
6. HAVE YOU EVER DONE ANYTHING, STARTED TO DO ANYTHING, OR PREPARED TO DO ANYTHING TO END YOUR LIFE?: NO

## 2024-03-08 ASSESSMENT — PAIN - FUNCTIONAL ASSESSMENT
PAIN_FUNCTIONAL_ASSESSMENT: 0-10

## 2024-03-08 ASSESSMENT — PAIN SCALES - GENERAL
PAIN_LEVEL: 0
PAINLEVEL_OUTOF10: 0 - NO PAIN

## 2024-03-08 NOTE — H&P
Outpatient Hospital Procedure H&P    Patient Profile  Name Satinder Silver  Date of Birth 1960  MRN [id number]  PCP Sukh Bhandari        Diagnosis: Cirrhosis, follow up of varices.   Procedure(s):  EGD.    Allergies  Allergies   Allergen Reactions    Aspirin Angioedema       Past Medical History   Past Medical History:   Diagnosis Date    Anemia     COVID-19     VACCINATED    Esophageal varices with bleeding (CMS/HCC)     GERD (gastroesophageal reflux disease)     History of ETOH abuse     Hypertension 09/29/2023       Medication Reviewed - yes  Prior to Admission medications    Medication Sig Start Date End Date Taking? Authorizing Provider   ferrous sulfate, 325 mg ferrous sulfate, tablet Take 1 tablet by mouth once daily with breakfast. 2/2/24  Yes Sukh Bhandari DO   lisinopril 20 mg tablet Take 1 tablet (20 mg) by mouth once daily. 9/29/23  Yes Sukh Bhandari DO   pantoprazole (ProtoNix) 40 mg EC tablet Take 1 tablet (40 mg) by mouth 2 times a day. Do not crush, chew, or split. 1/29/24 2/28/24  Roro Solo DO       Physical Exam  Vitals:    03/08/24 1054   BP: 132/81   Pulse: 99   Resp: 18   Temp: 37.2 °C (99 °F)   SpO2: 98%      Weight   Vitals:    03/08/24 1054   Weight: 88.1 kg (194 lb 3.6 oz)     BMI Body mass index is 30.42 kg/m².    General: A&Ox3, NAD.  HEENT: AT/NC.   CV: RRR. No murmur.  Resp: CTA bilaterally. No wheezing, rhonchi or rales.   GI: Soft, NT/ND. BSx4.  Extrem: No edema. Pulses intact.  Skin: No Jaundice.   Neuro: No focal deficits.   Psych: Normal mood and affect.        Sedation Plan: Deep Sedation.  Procedure Plan - pre-procedural (re)assesment completed by physician:  discharge/transfer patient when discharge criteria met    Hossein Avila DO  3/8/2024 11:34 AM

## 2024-03-08 NOTE — ANESTHESIA POSTPROCEDURE EVALUATION
Patient: Satinder Silver    Procedure Summary       Date: 03/08/24 Room / Location: [facility]    Anesthesia Start: 1209 Anesthesia Stop:     Procedure: EGD Diagnosis: Esophageal varices with bleeding in diseases classified elsewhere (CMS/HCC)    Scheduled Providers: Hossein Avila DO; Ion Glass MD; Nito Schwarz RN; Joi Grey Responsible Provider: Michael Matson MD    Anesthesia Type: MAC ASA Status: 3            Anesthesia Type: MAC    Vitals Value Taken Time   /66 03/08/24 1230   Temp  03/08/24 1230   Pulse 94 03/08/24 1230   Resp 18 03/08/24 1230   SpO2 98% 03/08/24 1230       Anesthesia Post Evaluation    Patient location during evaluation: bedside  Patient participation: complete - patient participated  Level of consciousness: awake and alert  Pain score: 0  Pain management: adequate  Multimodal analgesia pain management approach  Airway patency: patent  Cardiovascular status: acceptable  Respiratory status: acceptable and nasal cannula  Hydration status: acceptable  Postoperative Nausea and Vomiting: none        No notable events documented.

## 2024-03-08 NOTE — Clinical Note
Patient tolerated procedure well. Appears comfortable with no complaints of pain. VS stable. Arousable prior to transport. Patient transported to [facility] via cart.  Report called per RN.  Handoff completed.

## 2024-03-08 NOTE — ANESTHESIA POSTPROCEDURE EVALUATION
Patient: Satinder Silver    Procedure Summary       Date: 03/08/24 Room / Location: [facility]    Anesthesia Start:  Anesthesia Stop:     Procedure: EGD Diagnosis: Esophageal varices with bleeding in diseases classified elsewhere (CMS/HCC)    Scheduled Providers: Hossein Avlia DO; Ion Glass MD; Nito Schwarz RN Responsible Provider:     Anesthesia Type: MAC ASA Status: 3            Anesthesia Type: MAC    Vitals Value Taken Time   /70 03/08/24 1129   Temp  03/08/24 1129   Pulse 65 03/08/24 1129   Resp 21 03/08/24 1129   SpO2 100% 03/08/24 1129       Anesthesia Post Evaluation    Patient location during evaluation: bedside  Patient participation: complete - patient participated  Level of consciousness: awake and alert  Pain score: 0  Pain management: adequate  Multimodal analgesia pain management approach  Airway patency: patent  Cardiovascular status: acceptable  Respiratory status: acceptable and nasal cannula  Hydration status: acceptable  Postoperative Nausea and Vomiting: none        No notable events documented.

## 2024-03-08 NOTE — ANESTHESIA PREPROCEDURE EVALUATION
Patient: Satinder Silver    Procedure Information       Date/Time: 03/08/24 1220    Scheduled providers: Hossein Avila DO; Ion Glass MD    Procedure: EGD    Location: [facility]            Relevant Problems   Cardiovascular   (+) Hypertension      Endocrine   (+) Class 1 obesity due to excess calories with body mass index (BMI) of 30.0 to 30.9 in adult      GI   (+) Gastrointestinal hemorrhage with hematemesis      Hematology   (+) Symptomatic anemia       Clinical information reviewed:   Tobacco  Allergies  Meds   Med Hx  Surg Hx   Fam Hx  Soc Hx        NPO Detail:  NPO/Void Status  Date of Last Liquid: 03/07/24  Time of Last Liquid: 2359  Date of Last Solid: 03/07/24  Time of Last Solid: 1900         Physical Exam    Airway  Mallampati: II  TM distance: >3 FB  Neck ROM: full     Cardiovascular    Dental - normal exam     Pulmonary    Abdominal            Anesthesia Plan    History of general anesthesia?: yes  History of complications of general anesthesia?: no    ASA 3     MAC     The patient is not a current smoker.    intravenous induction   Anesthetic plan and risks discussed with patient.

## 2024-03-15 ENCOUNTER — PATIENT OUTREACH (OUTPATIENT)
Dept: CARE COORDINATION | Facility: CLINIC | Age: 64
End: 2024-03-15
Payer: OTHER GOVERNMENT

## 2024-03-15 LAB
LABORATORY COMMENT REPORT: NORMAL
PATH REPORT.FINAL DX SPEC: NORMAL
PATH REPORT.GROSS SPEC: NORMAL
PATH REPORT.TOTAL CANCER: NORMAL

## 2024-03-15 NOTE — PROGRESS NOTES
Call placed regarding one month post discharge follow up call.  At time of outreach call the patient feels as if their condition has improved since initial visit with PCP or specialist.  Questions or concerns regarding recovery period addressed at this time.   Reviewed any PCP or specialists progress notes/labs/radiology reports if applicable and addressed any questions or concerns.  Pt reports he has all of his medication and that he seen Dr. Bhandari on 3/9. Pt denies any questions regarding appts.    Verified upcoming appts;  -4/9/2024 1400 Dr. Austin AGGARWAL EGD  -5/16/2024 1020 PCP    Pt denies any questions, needs, or concerns at this time. He is encouraged to call if questions or needs arise.

## 2024-03-28 ENCOUNTER — ANESTHESIA EVENT (OUTPATIENT)
Dept: GASTROENTEROLOGY | Facility: HOSPITAL | Age: 64
End: 2024-03-28

## 2024-04-05 ENCOUNTER — LAB (OUTPATIENT)
Dept: LAB | Facility: LAB | Age: 64
End: 2024-04-05
Payer: OTHER GOVERNMENT

## 2024-04-05 DIAGNOSIS — D64.9 ANEMIA, UNSPECIFIED TYPE: ICD-10-CM

## 2024-04-05 DIAGNOSIS — D64.9 ANEMIA, UNSPECIFIED TYPE: Primary | ICD-10-CM

## 2024-04-05 DIAGNOSIS — R79.89 ABNORMAL TSH: ICD-10-CM

## 2024-04-05 LAB
BASOPHILS # BLD AUTO: 0.02 X10*3/UL (ref 0–0.1)
BASOPHILS NFR BLD AUTO: 0.4 %
EOSINOPHIL # BLD AUTO: 0.19 X10*3/UL (ref 0–0.7)
EOSINOPHIL NFR BLD AUTO: 4.1 %
ERYTHROCYTE [DISTWIDTH] IN BLOOD BY AUTOMATED COUNT: 19 % (ref 11.5–14.5)
HCT VFR BLD AUTO: 35.1 % (ref 41–52)
HGB BLD-MCNC: 11.1 G/DL (ref 13.5–17.5)
IMM GRANULOCYTES # BLD AUTO: 0.01 X10*3/UL (ref 0–0.7)
IMM GRANULOCYTES NFR BLD AUTO: 0.2 % (ref 0–0.9)
LYMPHOCYTES # BLD AUTO: 1.21 X10*3/UL (ref 1.2–4.8)
LYMPHOCYTES NFR BLD AUTO: 26.2 %
MCH RBC QN AUTO: 30.1 PG (ref 26–34)
MCHC RBC AUTO-ENTMCNC: 31.6 G/DL (ref 32–36)
MCV RBC AUTO: 95 FL (ref 80–100)
MONOCYTES # BLD AUTO: 0.62 X10*3/UL (ref 0.1–1)
MONOCYTES NFR BLD AUTO: 13.4 %
NEUTROPHILS # BLD AUTO: 2.57 X10*3/UL (ref 1.2–7.7)
NEUTROPHILS NFR BLD AUTO: 55.7 %
NRBC BLD-RTO: 0 /100 WBCS (ref 0–0)
PLATELET # BLD AUTO: 67 X10*3/UL (ref 150–450)
RBC # BLD AUTO: 3.69 X10*6/UL (ref 4.5–5.9)
TSH SERPL-ACNC: 3.1 MIU/L (ref 0.44–3.98)
WBC # BLD AUTO: 4.6 X10*3/UL (ref 4.4–11.3)

## 2024-04-05 PROCEDURE — 36415 COLL VENOUS BLD VENIPUNCTURE: CPT

## 2024-04-05 PROCEDURE — 84443 ASSAY THYROID STIM HORMONE: CPT

## 2024-04-05 PROCEDURE — 85025 COMPLETE CBC W/AUTO DIFF WBC: CPT

## 2024-04-09 ENCOUNTER — APPOINTMENT (OUTPATIENT)
Dept: GASTROENTEROLOGY | Facility: EXTERNAL LOCATION | Age: 64
End: 2024-04-09
Payer: OTHER GOVERNMENT

## 2024-04-09 ENCOUNTER — ANESTHESIA (OUTPATIENT)
Dept: GASTROENTEROLOGY | Facility: HOSPITAL | Age: 64
End: 2024-04-09

## 2024-04-19 ENCOUNTER — PATIENT OUTREACH (OUTPATIENT)
Dept: CARE COORDINATION | Facility: CLINIC | Age: 64
End: 2024-04-19
Payer: OTHER GOVERNMENT

## 2024-04-19 NOTE — PROGRESS NOTES
90 day post discharge TCM follow up call complete. Pt reports everything is going good. Pt states after he had received 2 shots in his right arm he was having some difficulty and went to see an orthopedic doctor on Monday. He states he was ordered an inflammatory medication- meloxicam.  Pt states he is taking his lisinopril and iron.    Verified upcoming appts;  -5/16/2024 1020 PCP  -5/21/2024 hepatology  -5/23/2024 EGD    Pt denies any current needs from PCP. Pt denies any questions, needs, or concerns at this time. He is encouraged to call if questions or needs arise.

## 2024-05-10 ENCOUNTER — LAB (OUTPATIENT)
Dept: LAB | Facility: LAB | Age: 64
End: 2024-05-10
Payer: OTHER GOVERNMENT

## 2024-05-10 DIAGNOSIS — D64.9 ANEMIA, UNSPECIFIED TYPE: ICD-10-CM

## 2024-05-10 LAB
BASOPHILS # BLD AUTO: 0.05 X10*3/UL (ref 0–0.1)
BASOPHILS NFR BLD AUTO: 1.2 %
EOSINOPHIL # BLD AUTO: 0.5 X10*3/UL (ref 0–0.7)
EOSINOPHIL NFR BLD AUTO: 12.1 %
ERYTHROCYTE [DISTWIDTH] IN BLOOD BY AUTOMATED COUNT: 15.2 % (ref 11.5–14.5)
HCT VFR BLD AUTO: 35.6 % (ref 41–52)
HGB BLD-MCNC: 11.9 G/DL (ref 13.5–17.5)
IMM GRANULOCYTES # BLD AUTO: 0 X10*3/UL (ref 0–0.7)
IMM GRANULOCYTES NFR BLD AUTO: 0 % (ref 0–0.9)
LYMPHOCYTES # BLD AUTO: 1.12 X10*3/UL (ref 1.2–4.8)
LYMPHOCYTES NFR BLD AUTO: 27.1 %
MCH RBC QN AUTO: 32.2 PG (ref 26–34)
MCHC RBC AUTO-ENTMCNC: 33.4 G/DL (ref 32–36)
MCV RBC AUTO: 96 FL (ref 80–100)
MONOCYTES # BLD AUTO: 0.4 X10*3/UL (ref 0.1–1)
MONOCYTES NFR BLD AUTO: 9.7 %
NEUTROPHILS # BLD AUTO: 2.07 X10*3/UL (ref 1.2–7.7)
NEUTROPHILS NFR BLD AUTO: 49.9 %
NRBC BLD-RTO: 0 /100 WBCS (ref 0–0)
PLATELET # BLD AUTO: 72 X10*3/UL (ref 150–450)
RBC # BLD AUTO: 3.7 X10*6/UL (ref 4.5–5.9)
RBC MORPH BLD: NORMAL
WBC # BLD AUTO: 4.1 X10*3/UL (ref 4.4–11.3)

## 2024-05-10 PROCEDURE — 36415 COLL VENOUS BLD VENIPUNCTURE: CPT

## 2024-05-10 PROCEDURE — 85025 COMPLETE CBC W/AUTO DIFF WBC: CPT

## 2024-05-16 ENCOUNTER — OFFICE VISIT (OUTPATIENT)
Dept: PRIMARY CARE | Facility: CLINIC | Age: 64
End: 2024-05-16
Payer: OTHER GOVERNMENT

## 2024-05-16 VITALS
HEART RATE: 98 BPM | SYSTOLIC BLOOD PRESSURE: 121 MMHG | WEIGHT: 211 LBS | OXYGEN SATURATION: 95 % | DIASTOLIC BLOOD PRESSURE: 75 MMHG | BODY MASS INDEX: 33.05 KG/M2

## 2024-05-16 DIAGNOSIS — Z86.19 HISTORY OF JOCK ITCH: ICD-10-CM

## 2024-05-16 DIAGNOSIS — I10 HYPERTENSION, UNSPECIFIED TYPE: Primary | ICD-10-CM

## 2024-05-16 DIAGNOSIS — B35.1 ONYCHOMYCOSIS: ICD-10-CM

## 2024-05-16 DIAGNOSIS — F40.10 SOCIAL ANXIETY DISORDER: ICD-10-CM

## 2024-05-16 DIAGNOSIS — D69.6 THROMBOCYTOPENIA (CMS-HCC): ICD-10-CM

## 2024-05-16 PROCEDURE — 3074F SYST BP LT 130 MM HG: CPT | Performed by: FAMILY MEDICINE

## 2024-05-16 PROCEDURE — 3078F DIAST BP <80 MM HG: CPT | Performed by: FAMILY MEDICINE

## 2024-05-16 PROCEDURE — 1036F TOBACCO NON-USER: CPT | Performed by: FAMILY MEDICINE

## 2024-05-16 PROCEDURE — 99214 OFFICE O/P EST MOD 30 MIN: CPT | Performed by: FAMILY MEDICINE

## 2024-05-16 RX ORDER — CLOTRIMAZOLE 1 %
CREAM (GRAM) TOPICAL 2 TIMES DAILY PRN
Qty: 60 G | Refills: 2 | Status: SHIPPED | OUTPATIENT
Start: 2024-05-16

## 2024-05-16 RX ORDER — ALPRAZOLAM 0.25 MG/1
0.25 TABLET ORAL DAILY PRN
Qty: 7 TABLET | Refills: 0 | Status: SHIPPED
Start: 2024-05-16 | End: 2024-05-16 | Stop reason: SDUPTHER

## 2024-05-16 RX ORDER — ALPRAZOLAM 0.25 MG/1
0.25 TABLET ORAL DAILY PRN
Qty: 7 TABLET | Refills: 0 | Status: SHIPPED | OUTPATIENT
Start: 2024-05-16 | End: 2024-05-23

## 2024-05-16 RX ORDER — MELOXICAM 15 MG/1
TABLET ORAL
COMMUNITY
Start: 2024-04-15 | End: 2024-05-21

## 2024-05-16 RX ORDER — CLOTRIMAZOLE 1 %
CREAM (GRAM) TOPICAL 2 TIMES DAILY PRN
Qty: 60 G | Refills: 2 | Status: SHIPPED
Start: 2024-05-16 | End: 2024-05-16 | Stop reason: SDUPTHER

## 2024-05-16 ASSESSMENT — ANXIETY QUESTIONNAIRES
3. WORRYING TOO MUCH ABOUT DIFFERENT THINGS: MORE THAN HALF THE DAYS
6. BECOMING EASILY ANNOYED OR IRRITABLE: SEVERAL DAYS
2. NOT BEING ABLE TO STOP OR CONTROL WORRYING: MORE THAN HALF THE DAYS
7. FEELING AFRAID AS IF SOMETHING AWFUL MIGHT HAPPEN: NOT AT ALL
1. FEELING NERVOUS, ANXIOUS, OR ON EDGE: SEVERAL DAYS
GAD7 TOTAL SCORE: 8
IF YOU CHECKED OFF ANY PROBLEMS ON THIS QUESTIONNAIRE, HOW DIFFICULT HAVE THESE PROBLEMS MADE IT FOR YOU TO DO YOUR WORK, TAKE CARE OF THINGS AT HOME, OR GET ALONG WITH OTHER PEOPLE: NOT DIFFICULT AT ALL
5. BEING SO RESTLESS THAT IT IS HARD TO SIT STILL: SEVERAL DAYS
4. TROUBLE RELAXING: SEVERAL DAYS

## 2024-05-16 ASSESSMENT — ENCOUNTER SYMPTOMS
ABDOMINAL PAIN: 0
NERVOUS/ANXIOUS: 1
FEVER: 0
BLOOD IN STOOL: 0
COUGH: 0
DIZZINESS: 0
HYPERTENSION: 1

## 2024-05-16 NOTE — PROGRESS NOTES
8Subjective   Patient ID: Satinder Silver is a 63 y.o. male who presents for Hypertension and OTHER (Go over labs. ).    Hypertension  This is a recurrent problem. Associated symptoms include anxiety.      He is here today for follow-up on hypertension and to discuss labs  He has a history of hypertension currently taking lisinopril 20 mg daily.  No significant lightheadedness  Past medical history is significant for hospitalization in late January 2024 with anemia and esophageal varices.  During that stay he did have a hemoglobin as low as 4.1 and received a total of 5 units PRBCs.  He underwent an EGD during that stay and had banding of 3 esophageal varices, and did have a CT scan which showed a cirrhotic appearing liver with findings of portal hypertension  He has been taking daily ferrous sulfate since then and his hemoglobin levels have been gradually improving.  Most recent labs checked 5/10/2024 showed that his hemoglobin level improved to 11.9.  He has been following with gastroenterology and had a follow-up EGD done in March, and is going to be scheduling another EGD soon.  He is scheduled to establish with hepatology next week.  Denies any significant fatigue.  No abdominal pain or visible bleeding  He has not had any alcohol since he was hospitalized.  He would like to discuss anxiety.  He has a history of anxiety which typically can get triggered only in social situations.  He will feel anxious when he is around a lot of other people.  He does not get much anxiety when at home.  He has several weddings coming up and is concerned about it causing anxiety, and is asking about a medication which she can take as needed for this.  He also thinks that he may have a toenail fungus infection.  He has had thickening of several toenails on both feet over the past several years        Review of Systems   Constitutional:  Negative for fever.   Respiratory:  Negative for cough.    Gastrointestinal:  Negative for  abdominal pain and blood in stool.   Neurological:  Negative for dizziness.   Psychiatric/Behavioral:  The patient is nervous/anxious.        Objective   /75   Pulse 98   Wt 95.7 kg (211 lb)   SpO2 95%   BMI 33.05 kg/m²     Physical Exam  Vitals reviewed.   Constitutional:       General: He is not in acute distress.     Appearance: Normal appearance. He is well-developed.   HENT:      Head: Normocephalic.   Eyes:      Conjunctiva/sclera: Conjunctivae normal.   Cardiovascular:      Rate and Rhythm: Normal rate and regular rhythm.      Heart sounds: Normal heart sounds.   Pulmonary:      Effort: Pulmonary effort is normal.      Breath sounds: Normal breath sounds.   Abdominal:      Palpations: Abdomen is soft.      Tenderness: There is no abdominal tenderness.   Musculoskeletal:      Right lower leg: No edema.      Left lower leg: No edema.   Skin:     Findings: No rash.      Comments: There is yellowish discoloration and thickening of the first, fourth and fifth toenails on right and first, third, fourth, and fifth toenails left foot   Neurological:      Mental Status: He is alert.   Psychiatric:         Mood and Affect: Mood normal.         Behavior: Behavior normal.         Assessment/Plan   Problem List Items Addressed This Visit          Medium    Hypertension - Primary     Other Visit Diagnoses       Social anxiety disorder        Relevant Medications    ALPRAZolam (Xanax) 0.25 mg tablet    History of jock itch        Relevant Medications    clotrimazole (Lotrimin) 1 % cream    Onychomycosis        Relevant Medications    efinaconazole 10 % solution with applicator    Thrombocytopenia (CMS-HCC)        Relevant Orders    CBC and Auto Differential    Referral to Benign Hematology        #1 hypertension: This is stable with blood pressure 121/75 today.  We will continue with lisinopril at current dose and follow-up in 3 months for recheck  2.  Anemia: His hemoglobin has continued to improve and most  recently was 11.9.  Will continue to monitor this until it returns to normal range.  Continue daily ferrous sulfate and we will recheck his CBC again in 1 month  He has had low platelets since hospitalization, most recently 72.  This is most likely due to hypersplenism.  We will refer him to establish with hematology for this  Continue to follow with GI for cirrhosis, and he is going to be establishing with hepatology next week  Anxiety: He reports anxiety symptoms which typically only occurs and social situations.  We discussed treatment options including starting a daily SSRI versus an as needed medication.  Since this is intermittent, he would prefer to start a medication that he can take as needed.  I will give him a very small amount of alprazolam, 0.25 mg as needed.  He was given a quantity of 7, and I discussed that he should take this medication very infrequently, and as needed only.  Adverse effects discussed, reviewed OARRS.  Signed CSA and ZURDO scale was completed today.  Will recheck symptoms in 3 months at follow-up  An OARRS report was printed and I have personally reviewed the results.  The report will be scanned into the health record.  I have considered the risk of abuse, dependance, addiction, and diversion.  I believe it is clinically appropriate for this patient to start taking this medication.  Onychomycosis: Involving several toenails of both feet.  We cannot treat with terbinafine due to his history of cirrhosis.  We will treat with topical efinaconazole daily and consider referral to podiatry if this does not improve with treatment.  He is also requesting a refill on topical clotrimazole for recurrent jock itch.  Reports this works adequately.  This was sent to pharmacy  Follow-up in 3 months for recheck

## 2024-05-17 ENCOUNTER — APPOINTMENT (OUTPATIENT)
Dept: GASTROENTEROLOGY | Facility: HOSPITAL | Age: 64
End: 2024-05-17
Payer: OTHER GOVERNMENT

## 2024-05-21 ENCOUNTER — LAB (OUTPATIENT)
Dept: LAB | Facility: LAB | Age: 64
End: 2024-05-21
Payer: OTHER GOVERNMENT

## 2024-05-21 ENCOUNTER — OFFICE VISIT (OUTPATIENT)
Dept: GASTROENTEROLOGY | Facility: CLINIC | Age: 64
End: 2024-05-21
Payer: OTHER GOVERNMENT

## 2024-05-21 VITALS
HEART RATE: 98 BPM | WEIGHT: 209 LBS | SYSTOLIC BLOOD PRESSURE: 127 MMHG | BODY MASS INDEX: 32.73 KG/M2 | DIASTOLIC BLOOD PRESSURE: 76 MMHG | TEMPERATURE: 98.2 F

## 2024-05-21 DIAGNOSIS — I85.11 ESOPHAGEAL VARICES WITH BLEEDING IN DISEASES CLASSIFIED ELSEWHERE (MULTI): ICD-10-CM

## 2024-05-21 DIAGNOSIS — K74.60 LIVER DISEASE, CHRONIC, WITH CIRRHOSIS (MULTI): ICD-10-CM

## 2024-05-21 DIAGNOSIS — K76.9 LIVER DISEASE, CHRONIC, WITH CIRRHOSIS (MULTI): ICD-10-CM

## 2024-05-21 LAB
A1AT SERPL NEPH-MCNC: 176 MG/DL (ref 84–218)
AFP SERPL-MCNC: 5 NG/ML (ref 0–9)
ALBUMIN SERPL BCP-MCNC: 3.6 G/DL (ref 3.4–5)
ALP SERPL-CCNC: 79 U/L (ref 33–136)
ALT SERPL W P-5'-P-CCNC: 20 U/L (ref 10–52)
ANION GAP SERPL CALC-SCNC: 12 MMOL/L (ref 10–20)
AST SERPL W P-5'-P-CCNC: 37 U/L (ref 9–39)
BILIRUB DIRECT SERPL-MCNC: 0.5 MG/DL (ref 0–0.3)
BILIRUB SERPL-MCNC: 1.9 MG/DL (ref 0–1.2)
BUN SERPL-MCNC: 15 MG/DL (ref 6–23)
CALCIUM SERPL-MCNC: 8.9 MG/DL (ref 8.6–10.6)
CHLORIDE SERPL-SCNC: 105 MMOL/L (ref 98–107)
CO2 SERPL-SCNC: 26 MMOL/L (ref 21–32)
CREAT SERPL-MCNC: 0.76 MG/DL (ref 0.5–1.3)
EGFRCR SERPLBLD CKD-EPI 2021: >90 ML/MIN/1.73M*2
GLUCOSE SERPL-MCNC: 84 MG/DL (ref 74–99)
HAV AB SER QL IA: REACTIVE
HBV CORE AB SER QL: NONREACTIVE
HBV SURFACE AB SER-ACNC: <3.1 MIU/ML
INR PPP: 1.2 (ref 0.9–1.1)
IRON SATN MFR SERPL: 62 % (ref 25–45)
IRON SERPL-MCNC: 199 UG/DL (ref 35–150)
PHOSPHATE SERPL-MCNC: 3.5 MG/DL (ref 2.5–4.9)
POTASSIUM SERPL-SCNC: 4.2 MMOL/L (ref 3.5–5.3)
PROT SERPL-MCNC: 6.4 G/DL (ref 6.4–8.2)
PROTHROMBIN TIME: 13.4 SECONDS (ref 9.8–12.8)
SODIUM SERPL-SCNC: 139 MMOL/L (ref 136–145)
TIBC SERPL-MCNC: 319 UG/DL (ref 240–445)
UIBC SERPL-MCNC: 120 UG/DL (ref 110–370)

## 2024-05-21 PROCEDURE — 86381 MITOCHONDRIAL ANTIBODY EACH: CPT

## 2024-05-21 PROCEDURE — 85610 PROTHROMBIN TIME: CPT

## 2024-05-21 PROCEDURE — 86708 HEPATITIS A ANTIBODY: CPT

## 2024-05-21 PROCEDURE — 84100 ASSAY OF PHOSPHORUS: CPT

## 2024-05-21 PROCEDURE — 86704 HEP B CORE ANTIBODY TOTAL: CPT

## 2024-05-21 PROCEDURE — 99215 OFFICE O/P EST HI 40 MIN: CPT | Performed by: INTERNAL MEDICINE

## 2024-05-21 PROCEDURE — 86038 ANTINUCLEAR ANTIBODIES: CPT

## 2024-05-21 PROCEDURE — 82103 ALPHA-1-ANTITRYPSIN TOTAL: CPT

## 2024-05-21 PROCEDURE — 83540 ASSAY OF IRON: CPT

## 2024-05-21 PROCEDURE — 3074F SYST BP LT 130 MM HG: CPT | Performed by: INTERNAL MEDICINE

## 2024-05-21 PROCEDURE — 83550 IRON BINDING TEST: CPT

## 2024-05-21 PROCEDURE — 3078F DIAST BP <80 MM HG: CPT | Performed by: INTERNAL MEDICINE

## 2024-05-21 PROCEDURE — 36415 COLL VENOUS BLD VENIPUNCTURE: CPT

## 2024-05-21 PROCEDURE — 82248 BILIRUBIN DIRECT: CPT

## 2024-05-21 PROCEDURE — 82105 ALPHA-FETOPROTEIN SERUM: CPT

## 2024-05-21 PROCEDURE — 1036F TOBACCO NON-USER: CPT | Performed by: INTERNAL MEDICINE

## 2024-05-21 PROCEDURE — 86706 HEP B SURFACE ANTIBODY: CPT

## 2024-05-21 PROCEDURE — 86256 FLUORESCENT ANTIBODY TITER: CPT

## 2024-05-21 PROCEDURE — 86015 ACTIN ANTIBODY EACH: CPT

## 2024-05-21 PROCEDURE — 80053 COMPREHEN METABOLIC PANEL: CPT

## 2024-05-21 PROCEDURE — 80321 ALCOHOLS BIOMARKERS 1OR 2: CPT

## 2024-05-21 ASSESSMENT — ENCOUNTER SYMPTOMS
FEVER: 0
VOMITING: 0
COLOR CHANGE: 0
TROUBLE SWALLOWING: 0
UNEXPECTED WEIGHT CHANGE: 0
SHORTNESS OF BREATH: 0
ABDOMINAL PAIN: 0
DEPRESSION: 0
DIARRHEA: 0
CHILLS: 0
ANAL BLEEDING: 0
NAUSEA: 0
CONSTIPATION: 0
RECTAL PAIN: 0
ABDOMINAL DISTENTION: 0
BLOOD IN STOOL: 0

## 2024-05-21 ASSESSMENT — PAIN SCALES - GENERAL: PAINLEVEL: 0-NO PAIN

## 2024-05-21 NOTE — PATIENT INSTRUCTIONS
Patients with advanced fibrosis or cirrhosis are at increased risk for liver cancer.  Successful treatment of liver cancer depends on early detection.  You should have regular interval screening for liver cancer every 6 months.  A blood test called Alpha-fetoprotein (AFP) and ultrasound of the liver is an important part of liver cancer screening to detect tumors.  You should also expect to have a follow up appointment with your liver doctor every 6 months.     [phone]    (Please see below for department name when scheduling)    Please have the following done before your next appointment:    [x]   LABS Due : Today and Nov   [x]   IMAGING Type : Ultrasound Due : First available and Nov  (Department Radiology)  [x]   FOLLOW UP APPOINTMENT with Dr. Sapp Due : Nov  (Department Gastro)    *Please have tests done before your next appointment    If you have any questions or need assistance, please don't hesitate to contact us.   Dr. Sapp: Office [phone] Fax: [phone]  Hepatology Nurse Coordinator: Luz BRADEN [phone]

## 2024-05-21 NOTE — PROGRESS NOTES
Subjective  He came to medical attention in January when he presented with EVB which was resucitated and has since undergone EVL twice. He has since sobered up from a longstanding alcohol use disorder.  He says he feels well and denies fluid overload or cognitive impairment.     Review of Systems   Constitutional:  Negative for chills, fever and unexpected weight change.   HENT:  Negative for trouble swallowing.    Respiratory:  Negative for shortness of breath.    Cardiovascular:  Negative for chest pain.   Gastrointestinal:  Negative for abdominal distention, abdominal pain, anal bleeding, blood in stool, constipation, diarrhea, nausea, rectal pain and vomiting.   Skin:  Negative for color change.       Physical Exam  Constitutional:       General: He is awake.      Appearance: Normal appearance.   HENT:      Head: Normocephalic and atraumatic.      Nose: Nose normal.      Mouth/Throat:      Mouth: Mucous membranes are moist.   Eyes:      Pupils: Pupils are equal, round, and reactive to light.   Pulmonary:      Effort: Pulmonary effort is normal.   Abdominal:      Palpations: There is splenomegaly.      Hernia: A hernia is present. Hernia is present in the umbilical area.   Neurological:      Mental Status: He is alert and oriented to person, place, and time. Mental status is at baseline.   Psychiatric:         Attention and Perception: Attention and perception normal.         Mood and Affect: Mood normal.         Behavior: Behavior normal.     LABS:   Lab Results   Component Value Date    ALBUMIN 3.2 (L) 02/02/2024    BILITOT 1.3 (H) 02/02/2024    BILIDIR 0.8 (H) 01/25/2024    ALKPHOS 112 02/02/2024    ALT 16 02/02/2024    AST 32 02/02/2024    PROT 6.0 (L) 02/02/2024    HEPBSAG Nonreactive 02/23/2024    HEPCAB Nonreactive 02/23/2024    INR 1.4 (H) 01/26/2024      Lab Results   Component Value Date    WBC 4.1 (L) 05/10/2024    HGB 11.9 (L) 05/10/2024    HCT 35.6 (L) 05/10/2024    MCV 96 05/10/2024    PLT 72 (L)  "05/10/2024     Lab Results   Component Value Date    INR 1.4 (H) 01/26/2024      No results found for: \"AFP\"          Liver disease, chronic, with cirrhosis (Multi)  This patient has cirrhosis and we discussed natural history. we emphasized the importance of health maintenance interventions to prevent complications of liver disease including  a) lifelong abstinence and engagement with 1:1 counseling and IOP  b) MELD/US/AFP every 6 months to assess liver function and survey for HCC  c) continuation of banding and consideration of carvedilol for CSPH after that.  d) watch out for symptoms of decompensated liver disease including cognitive impairment, excessive somnolence, lower extremity edema and increase in abdominal girth  e) follow up in the office every 6 months  f) perform fibroscan annually     "

## 2024-05-21 NOTE — ASSESSMENT & PLAN NOTE
This patient has cirrhosis and we discussed natural history. we emphasized the importance of health maintenance interventions to prevent complications of liver disease including  a) lifelong abstinence and engagement with 1:1 counseling and IOP  b) MELD/US/AFP every 6 months to assess liver function and survey for HCC  c) continuation of banding and consideration of carvedilol for CSPH after that.  d) watch out for symptoms of decompensated liver disease including cognitive impairment, excessive somnolence, lower extremity edema and increase in abdominal girth  e) follow up in the office every 6 months  f) perform fibroscan annually

## 2024-05-23 ENCOUNTER — APPOINTMENT (OUTPATIENT)
Dept: GASTROENTEROLOGY | Facility: EXTERNAL LOCATION | Age: 64
End: 2024-05-23
Payer: OTHER GOVERNMENT

## 2024-05-23 ENCOUNTER — TELEPHONE (OUTPATIENT)
Dept: PRIMARY CARE | Facility: CLINIC | Age: 64
End: 2024-05-23

## 2024-05-23 DIAGNOSIS — B35.1 ONYCHOMYCOSIS: Primary | ICD-10-CM

## 2024-05-23 LAB
LABORATORY REPORT: NORMAL
PETH INTERPRETATION: NORMAL
PLPETH BLD-MCNC: <10 NG/ML
POPETH BLD-MCNC: <10 NG/ML

## 2024-05-23 NOTE — TELEPHONE ENCOUNTER
He would like a referral to podiatry.    He also needs the referral for hematology removed. He discussed his issues with another Dr and they decided it was not necessary.

## 2024-05-23 NOTE — TELEPHONE ENCOUNTER
Please call and let him know that his insurance will not cover the Jublia that we sent in, and is not giving us any other alternative topical medications.  If the toenails are continuing to bother him, then the next step would be to see a podiatrist.  Let me know if he would like a referral and I can put an order in the system

## 2024-05-27 LAB
ANA SER QL HEP2 SUBST: NEGATIVE
MITOCHONDRIA AB SER QL IF: NEGATIVE
SMOOTH MUSCLE AB SER QL IF: ABNORMAL

## 2024-05-30 ENCOUNTER — APPOINTMENT (OUTPATIENT)
Dept: GASTROENTEROLOGY | Facility: HOSPITAL | Age: 64
End: 2024-05-30
Payer: OTHER GOVERNMENT

## 2024-06-01 ENCOUNTER — HOSPITAL ENCOUNTER (OUTPATIENT)
Dept: RADIOLOGY | Facility: HOSPITAL | Age: 64
Discharge: HOME | End: 2024-06-01
Payer: OTHER GOVERNMENT

## 2024-06-01 ENCOUNTER — LAB (OUTPATIENT)
Dept: LAB | Facility: LAB | Age: 64
End: 2024-06-01
Payer: OTHER GOVERNMENT

## 2024-06-01 DIAGNOSIS — K76.9 LIVER DISEASE, CHRONIC, WITH CIRRHOSIS (MULTI): ICD-10-CM

## 2024-06-01 DIAGNOSIS — K74.60 LIVER DISEASE, CHRONIC, WITH CIRRHOSIS (MULTI): ICD-10-CM

## 2024-06-01 DIAGNOSIS — I85.11 ESOPHAGEAL VARICES WITH BLEEDING IN DISEASES CLASSIFIED ELSEWHERE (MULTI): ICD-10-CM

## 2024-06-01 DIAGNOSIS — D69.6 THROMBOCYTOPENIA (CMS-HCC): ICD-10-CM

## 2024-06-01 PROCEDURE — 36415 COLL VENOUS BLD VENIPUNCTURE: CPT

## 2024-06-04 ENCOUNTER — HOSPITAL ENCOUNTER (OUTPATIENT)
Dept: RADIOLOGY | Facility: HOSPITAL | Age: 64
Discharge: HOME | End: 2024-06-04
Payer: OTHER GOVERNMENT

## 2024-06-04 PROCEDURE — 76705 ECHO EXAM OF ABDOMEN: CPT

## 2024-06-04 PROCEDURE — 76705 ECHO EXAM OF ABDOMEN: CPT | Performed by: RADIOLOGY

## 2024-06-11 DIAGNOSIS — D64.9 ANEMIA, UNSPECIFIED TYPE: ICD-10-CM

## 2024-06-11 RX ORDER — FERROUS SULFATE 325(65) MG
1 TABLET ORAL
Qty: 30 TABLET | Refills: 3 | Status: SHIPPED | OUTPATIENT
Start: 2024-06-11

## 2024-06-27 RX ORDER — TERBINAFINE HYDROCHLORIDE 250 MG/1
250 TABLET ORAL DAILY
COMMUNITY

## 2024-06-28 ENCOUNTER — HOSPITAL ENCOUNTER (OUTPATIENT)
Dept: GASTROENTEROLOGY | Facility: HOSPITAL | Age: 64
Setting detail: OUTPATIENT SURGERY
Discharge: HOME | End: 2024-06-28
Payer: OTHER GOVERNMENT

## 2024-06-28 ENCOUNTER — ANESTHESIA EVENT (OUTPATIENT)
Dept: GASTROENTEROLOGY | Facility: HOSPITAL | Age: 64
End: 2024-06-28
Payer: OTHER GOVERNMENT

## 2024-06-28 ENCOUNTER — ANESTHESIA (OUTPATIENT)
Dept: GASTROENTEROLOGY | Facility: HOSPITAL | Age: 64
End: 2024-06-28
Payer: OTHER GOVERNMENT

## 2024-06-28 VITALS
SYSTOLIC BLOOD PRESSURE: 145 MMHG | HEART RATE: 83 BPM | WEIGHT: 200.18 LBS | DIASTOLIC BLOOD PRESSURE: 79 MMHG | TEMPERATURE: 97.3 F | RESPIRATION RATE: 18 BRPM | BODY MASS INDEX: 31.35 KG/M2 | OXYGEN SATURATION: 98 %

## 2024-06-28 DIAGNOSIS — I85.11 ESOPHAGEAL VARICES WITH BLEEDING IN DISEASES CLASSIFIED ELSEWHERE (MULTI): ICD-10-CM

## 2024-06-28 DIAGNOSIS — K74.60 ESOPHAGEAL VARICES IN CIRRHOSIS (MULTI): Primary | ICD-10-CM

## 2024-06-28 DIAGNOSIS — K29.70 GASTRITIS WITHOUT BLEEDING, UNSPECIFIED CHRONICITY, UNSPECIFIED GASTRITIS TYPE: ICD-10-CM

## 2024-06-28 DIAGNOSIS — I85.10 ESOPHAGEAL VARICES IN CIRRHOSIS (MULTI): Primary | ICD-10-CM

## 2024-06-28 PROCEDURE — 43239 EGD BIOPSY SINGLE/MULTIPLE: CPT | Performed by: STUDENT IN AN ORGANIZED HEALTH CARE EDUCATION/TRAINING PROGRAM

## 2024-06-28 PROCEDURE — 7100000009 HC PHASE TWO TIME - INITIAL BASE CHARGE

## 2024-06-28 PROCEDURE — 2500000004 HC RX 250 GENERAL PHARMACY W/ HCPCS (ALT 636 FOR OP/ED): Performed by: STUDENT IN AN ORGANIZED HEALTH CARE EDUCATION/TRAINING PROGRAM

## 2024-06-28 PROCEDURE — 7100000010 HC PHASE TWO TIME - EACH INCREMENTAL 1 MINUTE

## 2024-06-28 PROCEDURE — 43244 EGD VARICES LIGATION: CPT | Performed by: STUDENT IN AN ORGANIZED HEALTH CARE EDUCATION/TRAINING PROGRAM

## 2024-06-28 PROCEDURE — 3700000001 HC GENERAL ANESTHESIA TIME - INITIAL BASE CHARGE

## 2024-06-28 PROCEDURE — 3700000002 HC GENERAL ANESTHESIA TIME - EACH INCREMENTAL 1 MINUTE

## 2024-06-28 PROCEDURE — 2500000004 HC RX 250 GENERAL PHARMACY W/ HCPCS (ALT 636 FOR OP/ED): Performed by: NURSE ANESTHETIST, CERTIFIED REGISTERED

## 2024-06-28 PROCEDURE — 2720000007 HC OR 272 NO HCPCS

## 2024-06-28 RX ORDER — ONDANSETRON HYDROCHLORIDE 2 MG/ML
INJECTION, SOLUTION INTRAVENOUS AS NEEDED
Status: DISCONTINUED | OUTPATIENT
Start: 2024-06-28 | End: 2024-06-28

## 2024-06-28 RX ORDER — PROPOFOL 10 MG/ML
INJECTION, EMULSION INTRAVENOUS AS NEEDED
Status: DISCONTINUED | OUTPATIENT
Start: 2024-06-28 | End: 2024-06-28

## 2024-06-28 RX ORDER — SODIUM CHLORIDE, SODIUM LACTATE, POTASSIUM CHLORIDE, CALCIUM CHLORIDE 600; 310; 30; 20 MG/100ML; MG/100ML; MG/100ML; MG/100ML
20 INJECTION, SOLUTION INTRAVENOUS CONTINUOUS
Status: DISCONTINUED | OUTPATIENT
Start: 2024-06-28 | End: 2024-06-29 | Stop reason: HOSPADM

## 2024-06-28 RX ORDER — OMEPRAZOLE 20 MG/1
20 TABLET, DELAYED RELEASE ORAL 2 TIMES DAILY
Qty: 90 TABLET | Refills: 1 | Status: SHIPPED | OUTPATIENT
Start: 2024-06-28

## 2024-06-28 SDOH — HEALTH STABILITY: MENTAL HEALTH: CURRENT SMOKER: 0

## 2024-06-28 ASSESSMENT — PAIN - FUNCTIONAL ASSESSMENT
PAIN_FUNCTIONAL_ASSESSMENT: 0-10

## 2024-06-28 ASSESSMENT — COLUMBIA-SUICIDE SEVERITY RATING SCALE - C-SSRS
6. HAVE YOU EVER DONE ANYTHING, STARTED TO DO ANYTHING, OR PREPARED TO DO ANYTHING TO END YOUR LIFE?: NO
1. IN THE PAST MONTH, HAVE YOU WISHED YOU WERE DEAD OR WISHED YOU COULD GO TO SLEEP AND NOT WAKE UP?: NO
2. HAVE YOU ACTUALLY HAD ANY THOUGHTS OF KILLING YOURSELF?: NO

## 2024-06-28 NOTE — DISCHARGE INSTRUCTIONS
Upper GI Endoscopy Discharge Instructions    About this topic  This procedure is done to view your upper gastrointestinal (GI) tract. This includes your throat and food pipe (esophagus). It also includes your stomach and the first part of the small bowel. Some people have this test for problems like coughing or throwing up blood. Other people may be having bad belly pain or blood in their stool. You may be having trouble swallowing or problems with acid reflux.  Doctors often use this test to look for problems like:  Ulcers  Cancer or tumor growths  Internal bleeding  Swelling  Inflammation  Infection  Gamez's esophagus  Gastroesophageal reflux disease or GERD  Swallowing problems    What care is needed at home?  Ask your doctor what you need to do when you go home. Make sure you ask questions if you do not understand what the doctor says. This way you will know what you need to do.  Your throat may feel sore. Your belly may also feel bloated. Your doctor may give you drugs to help with pain.  Talk to your doctor about when it is safe for you to go back to eating your normal diet and taking your drugs. You can drink fluid once the numbing drugs in your throat wear off.  What follow-up care is needed?  Your doctor may ask you to make visits to the office to check on your progress. Be sure to keep these visits.  The results of this test may help your doctor understand what kind of problem you have with your upper GI tract. Together you can make a plan for more care.  What drugs may be needed?  The doctor may order drugs to:  Help with pain  Decrease the acid in your stomach  Will physical activity be limited?  You may need to limit your activity for the rest of the day. After that, you will likely be able to go back to your normal activities.  What changes to diet are needed?  Soft foods like pudding or soups may be easier to eat at first. Ask your doctor if you need to make any changes to your diet.  What problems  could happen?  Painful swallowing  Upset stomach  Injury to food pipe  Throwing up  Tear in the esophagus  When do I need to call the doctor?  Signs of infection. These include a fever of 100.4°F (38°C) or higher, chills.  Very bad belly pain  Throwing up blood  Your belly is hard and swollen  Trouble swallowing or breathing  Upset stomach and throwing up  Bloody or black tarry stools  Cough, shortness of breath, or chest pain  A crunching feeling under the skin in your neck  You are not feeling better in 2 to 3 days or you are feeling worse  Teach Back: Helping You Understand  The Teach Back Method helps you understand the information we are giving you. After you talk with the staff, tell them in your own words what you learned. This helps to make sure the staff has described each thing clearly. It also helps to explain things that may have been confusing. Before going home, make sure you can do these:  I can tell you about my procedure.  I can tell you what changes I need to make with my diet or drugs.  I can tell you what I will do if I have very bad belly pain, throwing up blood, or my belly is hard and swollen.  Where can I learn more?  [employer]  https://www.gastro.org/practice-guidance/gi-patient-center/topic/upper-gi-endoscopy  Last Reviewed Date

## 2024-06-28 NOTE — ANESTHESIA PREPROCEDURE EVALUATION
Satinder Silver is a 63 y.o. male here for:    Esophagogastroduodenoscopy (EGD)  With Hossein Avila DO  Esophageal varices with bleeding in diseases classified elsewhere (Multi)    Relevant Problems   Cardiac   (+) Hypertension      GI   (+) Esophageal varices with bleeding in diseases classified elsewhere (Multi)   (+) Gastrointestinal hemorrhage with hematemesis      Liver   (+) Liver disease, chronic, with cirrhosis (Multi)      Endocrine   (+) Class 1 obesity due to excess calories with body mass index (BMI) of 30.0 to 30.9 in adult      Hematology   (+) Symptomatic anemia       Lab Results   Component Value Date    HGB 11.9 (L) 05/10/2024    HCT 35.6 (L) 05/10/2024    WBC 4.1 (L) 05/10/2024    PLT 72 (L) 05/10/2024     05/21/2024    K 4.2 05/21/2024     05/21/2024    CREATININE 0.76 05/21/2024    BUN 15 05/21/2024       Social History     Tobacco Use   Smoking Status Never   Smokeless Tobacco Never       Allergies   Allergen Reactions    Nsaids (Non-Steroidal Anti-Inflammatory Drug) Other     AVOID IN CIRRHOSIS    Aspirin Angioedema       Current Outpatient Medications   Medication Instructions    ALPRAZolam (XANAX) 0.25 mg, oral, Daily PRN    clotrimazole (Lotrimin) 1 % cream Topical, 2 times daily PRN    ferrous sulfate, 325 mg ferrous sulfate, tablet 1 tablet, oral, Daily with breakfast    lisinopril 20 mg, oral, Daily    terbinafine (LAMISIL) 250 mg, oral, Daily       Past Surgical History:   Procedure Laterality Date    COLONOSCOPY      UPPER GASTROINTESTINAL ENDOSCOPY         Family History   Problem Relation Name Age of Onset    Stroke Father      Heart failure Maternal Grandfather      Diabetes Paternal Grandmother      Diabetes Paternal Grandfather         NPO Details:  No data recorded    Physical Exam    Airway  Mallampati: II  TM distance: >3 FB  Neck ROM: full     Cardiovascular - normal exam     Dental - normal exam     Pulmonary - normal exam     Abdominal             Anesthesia Plan    History of general anesthesia?: yes  History of complications of general anesthesia?: no    ASA 3     MAC     The patient is not a current smoker.    intravenous induction   Anesthetic plan and risks discussed with patient.    Plan discussed with CRNA.

## 2024-06-28 NOTE — ANESTHESIA POSTPROCEDURE EVALUATION
Patient: Satinder Silver    Procedure Summary       Date: 06/28/24 Room / Location: [facility]    Anesthesia Start: 1052 Anesthesia Stop: 1109    Procedure: EGD Diagnosis: Esophageal varices with bleeding in diseases classified elsewhere (Multi)    Scheduled Providers: Hossein Avila DO; Sukh Hastings DO; Pily Saini RN; Jennifer Rosa RN Responsible Provider: Sukh Hastings DO    Anesthesia Type: MAC ASA Status: 3            Anesthesia Type: MAC    Vitals Value Taken Time   /69 06/28/24 1109   Temp 36.5 06/28/24 1109   Pulse 88 06/28/24 1109   Resp 18 06/28/24 1109   SpO2 100 06/28/24 1109       Anesthesia Post Evaluation    Patient location during evaluation: bedside  Patient participation: complete - patient participated  Level of consciousness: awake and alert  Pain score: 0  Pain management: adequate  Airway patency: patent  Cardiovascular status: acceptable and stable  Respiratory status: acceptable and room air  Hydration status: acceptable  Postoperative Nausea and Vomiting: none      No notable events documented.

## 2024-06-28 NOTE — Clinical Note
Huddle and Timeout completed together with team. Patient wristband and GALEN information verified.  Anesthesia safety check completed

## 2024-06-28 NOTE — H&P
Outpatient Hospital Procedure H&P    Patient Profile  Name Satinder Silver  Date of Birth 1960  MRN [id number]  PCP Sukh Bhandari        Diagnosis: Cirrhosis, variceal management.  Procedure(s):  EGD.    Allergies  Allergies   Allergen Reactions    Nsaids (Non-Steroidal Anti-Inflammatory Drug) Other     AVOID IN CIRRHOSIS    Aspirin Angioedema       Past Medical History   Past Medical History:   Diagnosis Date    Anemia     Anxiety     Arthritis     Cirrhosis (Multi)     COVID-19     VACCINATED    Diverticulosis     Esophageal varices with bleeding (Multi)     GERD (gastroesophageal reflux disease)     History of ETOH abuse     Hypertension 09/29/2023    states he is not taking medications currently    Skin disorder        Medication Reviewed - yes  Prior to Admission medications    Medication Sig Start Date End Date Taking? Authorizing Provider   clotrimazole (Lotrimin) 1 % cream Apply topically 2 times a day as needed (rash). 5/16/24  Yes Sukh Bhandari, DO   ferrous sulfate, 325 mg ferrous sulfate, tablet TAKE 1 TABLET BY MOUTH ONCE DAILY WITH BREAKFAST 6/11/24  Yes Sukh Bhandari DO   terbinafine (LamISIL) 250 mg tablet Take 1 tablet (250 mg) by mouth once daily.   Yes Historical Provider, MD   ALPRAZolam (Xanax) 0.25 mg tablet Take 1 tablet (0.25 mg) by mouth once daily as needed for anxiety for up to 7 days. 5/16/24 5/23/24  Sukh Bhandari DO   lisinopril 20 mg tablet Take 1 tablet (20 mg) by mouth once daily.  Patient not taking: Reported on 6/28/2024 9/29/23   Sukh Bhandari DO       Physical Exam  Vitals:    06/28/24 0934   BP: 132/75   Pulse: 88   Resp: 18   SpO2: 98%      Weight   Vitals:    06/28/24 0934   Weight: 90.8 kg (200 lb 2.8 oz)     BMI Body mass index is 31.35 kg/m².    General: A&Ox3, NAD.  HEENT: AT/NC.   CV: RRR.   Resp: CTA bilaterally. No wheezing, rhonchi or rales.   GI: Soft, NT/ND.   Extrem: No edema.   Skin: No Jaundice.   Neuro: No focal deficits.   Psych: Normal mood and affect.        Sedation  Plan: Deep Sedation.  Procedure Plan - pre-procedural (re)assesment completed by physician:  discharge/transfer patient when discharge criteria met    Hossein Avila DO  6/28/2024 9:50 AM

## 2024-06-28 NOTE — Clinical Note
Patient tolerated procedure well. Appears comfortable with no complaints of pain. VS stable. Arousable prior to transport. Patient transported to [facility] via cart.  Report called              . Handoff completed

## 2024-08-02 RX ORDER — ACETAMINOPHEN 500 MG
500 TABLET ORAL EVERY 6 HOURS PRN
COMMUNITY

## 2024-08-06 ENCOUNTER — LAB (OUTPATIENT)
Dept: LAB | Facility: LAB | Age: 64
End: 2024-08-06
Payer: OTHER GOVERNMENT

## 2024-08-06 ENCOUNTER — TELEPHONE (OUTPATIENT)
Dept: PRIMARY CARE | Facility: CLINIC | Age: 64
End: 2024-08-06
Payer: OTHER GOVERNMENT

## 2024-08-06 DIAGNOSIS — D64.9 ANEMIA, UNSPECIFIED TYPE: ICD-10-CM

## 2024-08-06 DIAGNOSIS — D64.9 ANEMIA, UNSPECIFIED TYPE: Primary | ICD-10-CM

## 2024-08-06 LAB
BASOPHILS # BLD AUTO: 0.03 X10*3/UL (ref 0–0.1)
BASOPHILS NFR BLD AUTO: 0.6 %
EOSINOPHIL # BLD AUTO: 0.15 X10*3/UL (ref 0–0.7)
EOSINOPHIL NFR BLD AUTO: 2.8 %
ERYTHROCYTE [DISTWIDTH] IN BLOOD BY AUTOMATED COUNT: 13.7 % (ref 11.5–14.5)
HCT VFR BLD AUTO: 37.9 % (ref 41–52)
HGB BLD-MCNC: 13 G/DL (ref 13.5–17.5)
IMM GRANULOCYTES # BLD AUTO: 0.01 X10*3/UL (ref 0–0.7)
IMM GRANULOCYTES NFR BLD AUTO: 0.2 % (ref 0–0.9)
LYMPHOCYTES # BLD AUTO: 1.31 X10*3/UL (ref 1.2–4.8)
LYMPHOCYTES NFR BLD AUTO: 24.2 %
MCH RBC QN AUTO: 33.7 PG (ref 26–34)
MCHC RBC AUTO-ENTMCNC: 34.3 G/DL (ref 32–36)
MCV RBC AUTO: 98 FL (ref 80–100)
MONOCYTES # BLD AUTO: 0.47 X10*3/UL (ref 0.1–1)
MONOCYTES NFR BLD AUTO: 8.7 %
NEUTROPHILS # BLD AUTO: 3.44 X10*3/UL (ref 1.2–7.7)
NEUTROPHILS NFR BLD AUTO: 63.5 %
NRBC BLD-RTO: 0 /100 WBCS (ref 0–0)
PLATELET # BLD AUTO: 66 X10*3/UL (ref 150–450)
RBC # BLD AUTO: 3.86 X10*6/UL (ref 4.5–5.9)
WBC # BLD AUTO: 5.4 X10*3/UL (ref 4.4–11.3)

## 2024-08-06 PROCEDURE — 36415 COLL VENOUS BLD VENIPUNCTURE: CPT

## 2024-08-06 PROCEDURE — 85025 COMPLETE CBC W/AUTO DIFF WBC: CPT

## 2024-08-06 NOTE — TELEPHONE ENCOUNTER
Lab order has , needs a new order placed so that he can get it done prior to his appointment on 24.  Thank you!  
Thumb Spica Splint

## 2024-08-09 ENCOUNTER — ANESTHESIA EVENT (OUTPATIENT)
Dept: GASTROENTEROLOGY | Facility: HOSPITAL | Age: 64
End: 2024-08-09
Payer: OTHER GOVERNMENT

## 2024-08-09 ENCOUNTER — ANESTHESIA (OUTPATIENT)
Dept: GASTROENTEROLOGY | Facility: HOSPITAL | Age: 64
End: 2024-08-09
Payer: OTHER GOVERNMENT

## 2024-08-09 ENCOUNTER — HOSPITAL ENCOUNTER (OUTPATIENT)
Dept: GASTROENTEROLOGY | Facility: HOSPITAL | Age: 64
Setting detail: OUTPATIENT SURGERY
Discharge: HOME | End: 2024-08-09
Payer: OTHER GOVERNMENT

## 2024-08-09 VITALS
BODY MASS INDEX: 31.04 KG/M2 | RESPIRATION RATE: 18 BRPM | SYSTOLIC BLOOD PRESSURE: 135 MMHG | WEIGHT: 197.75 LBS | HEART RATE: 81 BPM | TEMPERATURE: 97.2 F | HEIGHT: 67 IN | OXYGEN SATURATION: 97 % | DIASTOLIC BLOOD PRESSURE: 76 MMHG

## 2024-08-09 DIAGNOSIS — K70.30 ALCOHOLIC CIRRHOSIS, UNSPECIFIED WHETHER ASCITES PRESENT (MULTI): ICD-10-CM

## 2024-08-09 DIAGNOSIS — K21.9 GASTRO-ESOPHAGEAL REFLUX DISEASE WITHOUT ESOPHAGITIS: Primary | ICD-10-CM

## 2024-08-09 DIAGNOSIS — I85.10 SECONDARY ESOPHAGEAL VARICES WITHOUT BLEEDING (MULTI): ICD-10-CM

## 2024-08-09 DIAGNOSIS — K20.90 ESOPHAGITIS: ICD-10-CM

## 2024-08-09 PROCEDURE — 7100000010 HC PHASE TWO TIME - EACH INCREMENTAL 1 MINUTE

## 2024-08-09 PROCEDURE — 2500000004 HC RX 250 GENERAL PHARMACY W/ HCPCS (ALT 636 FOR OP/ED): Performed by: NURSE ANESTHETIST, CERTIFIED REGISTERED

## 2024-08-09 PROCEDURE — 2500000004 HC RX 250 GENERAL PHARMACY W/ HCPCS (ALT 636 FOR OP/ED): Performed by: STUDENT IN AN ORGANIZED HEALTH CARE EDUCATION/TRAINING PROGRAM

## 2024-08-09 PROCEDURE — 7100000009 HC PHASE TWO TIME - INITIAL BASE CHARGE

## 2024-08-09 PROCEDURE — 3700000002 HC GENERAL ANESTHESIA TIME - EACH INCREMENTAL 1 MINUTE

## 2024-08-09 PROCEDURE — 43239 EGD BIOPSY SINGLE/MULTIPLE: CPT | Performed by: STUDENT IN AN ORGANIZED HEALTH CARE EDUCATION/TRAINING PROGRAM

## 2024-08-09 PROCEDURE — 2500000005 HC RX 250 GENERAL PHARMACY W/O HCPCS: Performed by: NURSE ANESTHETIST, CERTIFIED REGISTERED

## 2024-08-09 PROCEDURE — 3700000001 HC GENERAL ANESTHESIA TIME - INITIAL BASE CHARGE

## 2024-08-09 RX ORDER — PROPOFOL 10 MG/ML
INJECTION, EMULSION INTRAVENOUS AS NEEDED
Status: DISCONTINUED | OUTPATIENT
Start: 2024-08-09 | End: 2024-08-09

## 2024-08-09 RX ORDER — OMEPRAZOLE 40 MG/1
40 CAPSULE, DELAYED RELEASE ORAL DAILY
Qty: 90 CAPSULE | Refills: 1 | Status: SHIPPED | OUTPATIENT
Start: 2024-08-09

## 2024-08-09 RX ORDER — LIDOCAINE HYDROCHLORIDE 20 MG/ML
INJECTION, SOLUTION EPIDURAL; INFILTRATION; INTRACAUDAL; PERINEURAL AS NEEDED
Status: DISCONTINUED | OUTPATIENT
Start: 2024-08-09 | End: 2024-08-09

## 2024-08-09 RX ORDER — SODIUM CHLORIDE, SODIUM LACTATE, POTASSIUM CHLORIDE, CALCIUM CHLORIDE 600; 310; 30; 20 MG/100ML; MG/100ML; MG/100ML; MG/100ML
20 INJECTION, SOLUTION INTRAVENOUS CONTINUOUS
Status: DISCONTINUED | OUTPATIENT
Start: 2024-08-09 | End: 2024-08-10 | Stop reason: HOSPADM

## 2024-08-09 SDOH — HEALTH STABILITY: MENTAL HEALTH: CURRENT SMOKER: 0

## 2024-08-09 ASSESSMENT — PAIN - FUNCTIONAL ASSESSMENT
PAIN_FUNCTIONAL_ASSESSMENT: 0-10

## 2024-08-09 ASSESSMENT — PAIN SCALES - GENERAL
PAINLEVEL_OUTOF10: 0 - NO PAIN
PAIN_LEVEL: 1
PAINLEVEL_OUTOF10: 0 - NO PAIN
PAINLEVEL_OUTOF10: 0 - NO PAIN

## 2024-08-09 NOTE — ANESTHESIA POSTPROCEDURE EVALUATION
Patient: Satinder Silver    Procedure Summary       Date: 08/09/24 Room / Location: [facility]    Anesthesia Start: 1043 Anesthesia Stop: 1055    Procedure: EGD Diagnosis:       Gastro-esophageal reflux disease without esophagitis      Gastro-esophageal reflux disease without esophagitis    Scheduled Providers: Hossein Avila DO; Sukh Hastings DO Responsible Provider: Sukh Hastings DO    Anesthesia Type: MAC ASA Status: 3            Anesthesia Type: MAC    Vitals Value Taken Time   /71 08/09/24 1055   Temp 36 08/09/24 1055   Pulse 91 08/09/24 1055   Resp 16 08/09/24 1055   SpO2 98 08/09/24 1055       Anesthesia Post Evaluation    Patient location during evaluation: bedside  Patient participation: complete - patient participated  Level of consciousness: awake  Pain score: 1  Pain management: adequate  Airway patency: patent  Cardiovascular status: acceptable  Respiratory status: acceptable  Hydration status: acceptable  Postoperative Nausea and Vomiting: none        There were no known notable events for this encounter.

## 2024-08-09 NOTE — ANESTHESIA PREPROCEDURE EVALUATION
Satinder Silver is a 63 y.o. male here for:    Esophagogastroduodenoscopy (EGD)  With Hossein Avila, DO  Gastro-esophageal reflux disease without esophagitis    Relevant Problems   Cardiac   (+) Hypertension      GI   (+) Esophageal varices with bleeding in diseases classified elsewhere (Multi)   (+) Gastrointestinal hemorrhage with hematemesis      Liver   (+) Liver disease, chronic, with cirrhosis (Multi)      Endocrine   (+) Class 1 obesity due to excess calories with body mass index (BMI) of 30.0 to 30.9 in adult      Hematology   (+) Symptomatic anemia       Lab Results   Component Value Date    HGB 13.0 (L) 08/06/2024    HCT 37.9 (L) 08/06/2024    WBC 5.4 08/06/2024    PLT 66 (L) 08/06/2024     05/21/2024    K 4.2 05/21/2024     05/21/2024    CREATININE 0.76 05/21/2024    BUN 15 05/21/2024       Social History     Tobacco Use   Smoking Status Never   Smokeless Tobacco Never       Allergies   Allergen Reactions    Nsaids (Non-Steroidal Anti-Inflammatory Drug) Other     AVOID IN CIRRHOSIS    Aspirin Angioedema       Current Outpatient Medications   Medication Instructions    acetaminophen (TYLENOL) 500 mg, oral, Every 6 hours PRN    ALPRAZolam (XANAX) 0.25 mg, oral, Daily PRN    clotrimazole (Lotrimin) 1 % cream Topical, 2 times daily PRN    ferrous sulfate, 325 mg ferrous sulfate, tablet 1 tablet, oral, Daily with breakfast    lisinopril 20 mg, oral, Daily    omeprazole OTC (PRILOSEC OTC) 20 mg, oral, 2 times daily, Do not crush, chew, or split.       Past Surgical History:   Procedure Laterality Date    COLONOSCOPY      UPPER GASTROINTESTINAL ENDOSCOPY         Family History   Problem Relation Name Age of Onset    Stroke Father      Heart failure Maternal Grandfather      Diabetes Paternal Grandmother      Diabetes Paternal Grandfather         NPO Details:  No data recorded    Physical Exam    Airway  Mallampati: II  TM distance: >3 FB  Neck ROM: full     Cardiovascular - normal exam      Dental - normal exam     Pulmonary - normal exam     Abdominal            Anesthesia Plan    History of general anesthesia?: yes  History of complications of general anesthesia?: no    ASA 3     MAC     The patient is not a current smoker.    intravenous induction   Anesthetic plan and risks discussed with patient.    Plan discussed with CRNA.

## 2024-08-09 NOTE — H&P
Outpatient Hospital Procedure H&P    Patient Profile  Name Satinder Silver  Date of Birth 1960  MRN [id number]  PCP Sukh Bhandari        Diagnosis: Follow up of esophageal varices.  Procedure(s):  EGD.    Allergies  Allergies   Allergen Reactions    Nsaids (Non-Steroidal Anti-Inflammatory Drug) Other     AVOID IN CIRRHOSIS    Aspirin Angioedema       Past Medical History   Past Medical History:   Diagnosis Date    Anemia     Arthritis     Cirrhosis (Multi)     COVID-19     VACCINATED    Diverticulosis     Esophageal varices with bleeding (Multi)     GERD (gastroesophageal reflux disease)     History of ETOH abuse     Hypertension 09/29/2023    states he is not taking medications currently    Skin disorder        Medication Reviewed - yes  Prior to Admission medications    Medication Sig Start Date End Date Taking? Authorizing Provider   acetaminophen (Tylenol) 500 mg tablet Take 1 tablet (500 mg) by mouth every 6 hours if needed for mild pain (1 - 3).   Yes Historical Provider, MD   clotrimazole (Lotrimin) 1 % cream Apply topically 2 times a day as needed (rash). 5/16/24  Yes Sukh Bhandari, DO   ALPRAZolam (Xanax) 0.25 mg tablet Take 1 tablet (0.25 mg) by mouth once daily as needed for anxiety for up to 7 days. 5/16/24 5/23/24  Sukh Bhandari DO   ferrous sulfate, 325 mg ferrous sulfate, tablet TAKE 1 TABLET BY MOUTH ONCE DAILY WITH BREAKFAST  Patient not taking: Reported on 8/2/2024 6/11/24   Sukh Bhandari DO   lisinopril 20 mg tablet Take 1 tablet (20 mg) by mouth once daily.  Patient not taking: Reported on 6/28/2024 9/29/23   Sukh Bhandari DO   omeprazole OTC (PriLOSEC OTC) 20 mg EC tablet Take 1 tablet (20 mg) by mouth 2 times a day. Do not crush, chew, or split.  Patient not taking: Reported on 8/2/2024 6/28/24   Hossein Avila,        Physical Exam  Vitals:    08/09/24 0922   BP: 142/72   Pulse: 88   Resp: 16   Temp: 36.1 °C (97 °F)   SpO2: 98%      Weight   Vitals:    08/09/24 0922   Weight: 89.7 kg (197 lb  12 oz)     BMI Body mass index is 30.97 kg/m².    General: A&Ox3, NAD.  HEENT: AT/NC.   CV: RRR.   Resp: CTA bilaterally. No wheezing, rhonchi or rales.   GI: Soft, NT/ND.   Extrem: No edema.   Skin: No Jaundice.   Neuro: No focal deficits.   Psych: Normal mood and affect.        Sedation Plan: Deep Sedation.  Procedure Plan - pre-procedural (re)assesment completed by physician:  discharge/transfer patient when discharge criteria met    Hossein Avila DO  8/9/2024 9:27 AM

## 2024-08-16 ENCOUNTER — APPOINTMENT (OUTPATIENT)
Dept: PRIMARY CARE | Facility: CLINIC | Age: 64
End: 2024-08-16
Payer: OTHER GOVERNMENT

## 2024-08-16 VITALS
HEART RATE: 89 BPM | SYSTOLIC BLOOD PRESSURE: 132 MMHG | DIASTOLIC BLOOD PRESSURE: 81 MMHG | TEMPERATURE: 97.6 F | BODY MASS INDEX: 32.11 KG/M2 | WEIGHT: 205 LBS | OXYGEN SATURATION: 98 %

## 2024-08-16 DIAGNOSIS — D64.9 ANEMIA, UNSPECIFIED TYPE: ICD-10-CM

## 2024-08-16 DIAGNOSIS — I10 HYPERTENSION, UNSPECIFIED TYPE: Primary | ICD-10-CM

## 2024-08-16 DIAGNOSIS — Z86.19 HISTORY OF JOCK ITCH: ICD-10-CM

## 2024-08-16 PROCEDURE — 3079F DIAST BP 80-89 MM HG: CPT | Performed by: FAMILY MEDICINE

## 2024-08-16 PROCEDURE — 99214 OFFICE O/P EST MOD 30 MIN: CPT | Performed by: FAMILY MEDICINE

## 2024-08-16 PROCEDURE — 3075F SYST BP GE 130 - 139MM HG: CPT | Performed by: FAMILY MEDICINE

## 2024-08-16 PROCEDURE — 1036F TOBACCO NON-USER: CPT | Performed by: FAMILY MEDICINE

## 2024-08-16 RX ORDER — KETOCONAZOLE 20 MG/G
1 CREAM TOPICAL 2 TIMES DAILY
Qty: 30 G | Refills: 3 | Status: SHIPPED | OUTPATIENT
Start: 2024-08-16

## 2024-08-16 RX ORDER — CLOTRIMAZOLE 1 %
CREAM (GRAM) TOPICAL 2 TIMES DAILY PRN
Qty: 60 G | Refills: 2 | Status: CANCELLED | OUTPATIENT
Start: 2024-08-16

## 2024-08-16 ASSESSMENT — ENCOUNTER SYMPTOMS
ARTHRALGIAS: 1
COUGH: 0
ABDOMINAL DISTENTION: 0
ABDOMINAL PAIN: 0
FEVER: 0

## 2024-08-16 NOTE — PROGRESS NOTES
Subjective   Patient ID: Satinder Silver is a 63 y.o. male who presents for Hypertension.    HPI       He is here today for follow-up  He is currently following with both hepatology and gastroenterology due to history of cirrhosis  He was previously hospitalized 1/2024 with anemia and esophageal varices.  Hemoglobin was as low as 4.1 and he received total 5 units PRBC.  EGD done during that stay showed esophageal varices which were banded and a CT scan showed a cirrhotic appearing liver with findings of cortical hypertension  We have been following his hemoglobin regularly since then.  He had been taking daily ferrous sulfate up until approximately 3 months ago.  His most recent hemoglobin checked 8/6/2024 showed that his hemoglobin improved to 13.0  He has been following with gastroenterology and had an EGD done earlier this month.  Repeat EGD in 1 year was recommended.  He is currently taking a PPI  He has also now established with hepatology and is following up with them every 6 months  He is feeling well denies any abdominal pain or swelling  He did see podiatry for onychomycosis and took an oral antifungal pill for a few weeks  He has a history of hypertension and is no longer taking lisinopril.  Does not check blood pressure out of the office  He has continued to abstain from alcohol  He has a history of recurrent jock itch and has been using topical clotrimazole as needed.  This medication does not always work adequately and he would like to try a different topical med.  This often will flareup more often in the summer          Review of Systems   Constitutional:  Negative for fever.   Respiratory:  Negative for cough.    Cardiovascular:  Negative for chest pain.   Gastrointestinal:  Negative for abdominal distention and abdominal pain.   Musculoskeletal:  Positive for arthralgias (Knee pain).       Objective   /81   Pulse 89   Temp 36.4 °C (97.6 °F) (Temporal)   Wt 93 kg (205 lb)   SpO2 98%   BMI  32.11 kg/m²     Physical Exam  Vitals reviewed.   Constitutional:       General: He is not in acute distress.     Appearance: Normal appearance. He is well-developed.   HENT:      Head: Normocephalic.   Eyes:      Conjunctiva/sclera: Conjunctivae normal.   Cardiovascular:      Rate and Rhythm: Normal rate and regular rhythm.      Heart sounds: Normal heart sounds.   Pulmonary:      Effort: Pulmonary effort is normal.      Breath sounds: Normal breath sounds.   Abdominal:      General: There is no distension.      Palpations: Abdomen is soft.      Tenderness: There is no abdominal tenderness.   Musculoskeletal:      Right lower leg: No edema.      Left lower leg: No edema.   Skin:     Findings: No rash.   Neurological:      Mental Status: He is alert.   Psychiatric:         Mood and Affect: Mood normal.         Behavior: Behavior normal.         Assessment/Plan   Assessment & Plan  Hypertension, unspecified type    Orders:    CBC and Auto Differential; Future    Lipid Panel; Future    History of jock itch    Orders:    ketoconazole (NIZOral) 2 % cream; Apply 1 Application topically 2 times a day.    Anemia, unspecified type         Hypertension: He is no longer taking lisinopril, and his blood pressure today is 132/81.  Will continue to monitor this and can consider restarting lisinopril if his BP ever increases to greater than 140/90  Recurrent tinea cruris: This has not been optimally controlled on topical clotrimazole.  Will stop this medication and start topical ketoconazole twice daily as needed for flareups.  Recheck at follow-up  Anemia: His hemoglobin level has continued to improve, and now is only mildly decreased at 13.0.  He has not taken ferrous sulfate over the past several months.  Recommended starting a daily multivitamin that contains iron and we will continue to monitor this.  Recheck CBC again in approximately 3 months  Continue to follow with gastroenterology and hepatology for cirrhosis and  esophageal varices  Follow-up in 6 months for recheck and CPE

## 2024-09-12 ENCOUNTER — TELEPHONE (OUTPATIENT)
Dept: PRIMARY CARE | Facility: CLINIC | Age: 64
End: 2024-09-12
Payer: OTHER GOVERNMENT

## 2024-09-12 DIAGNOSIS — Z12.11 SCREENING FOR COLON CANCER: Primary | ICD-10-CM

## 2024-10-17 ENCOUNTER — APPOINTMENT (OUTPATIENT)
Dept: GASTROENTEROLOGY | Facility: EXTERNAL LOCATION | Age: 64
End: 2024-10-17
Payer: OTHER GOVERNMENT

## 2024-11-01 ENCOUNTER — ANESTHESIA EVENT (OUTPATIENT)
Dept: GASTROENTEROLOGY | Facility: EXTERNAL LOCATION | Age: 64
End: 2024-11-01

## 2024-11-12 ENCOUNTER — APPOINTMENT (OUTPATIENT)
Dept: GASTROENTEROLOGY | Facility: EXTERNAL LOCATION | Age: 64
End: 2024-11-12
Payer: OTHER GOVERNMENT

## 2024-11-14 ENCOUNTER — ANESTHESIA (OUTPATIENT)
Dept: GASTROENTEROLOGY | Facility: EXTERNAL LOCATION | Age: 64
End: 2024-11-14

## 2024-11-14 ENCOUNTER — APPOINTMENT (OUTPATIENT)
Dept: GASTROENTEROLOGY | Facility: EXTERNAL LOCATION | Age: 64
End: 2024-11-14
Payer: OTHER GOVERNMENT

## 2024-11-14 VITALS
SYSTOLIC BLOOD PRESSURE: 127 MMHG | DIASTOLIC BLOOD PRESSURE: 74 MMHG | OXYGEN SATURATION: 98 % | WEIGHT: 200 LBS | HEART RATE: 81 BPM | RESPIRATION RATE: 10 BRPM | BODY MASS INDEX: 31.39 KG/M2 | TEMPERATURE: 97.3 F | HEIGHT: 67 IN

## 2024-11-14 DIAGNOSIS — Z12.11 SCREENING FOR COLON CANCER: Primary | ICD-10-CM

## 2024-11-14 PROCEDURE — 45385 COLONOSCOPY W/LESION REMOVAL: CPT | Performed by: INTERNAL MEDICINE

## 2024-11-14 PROCEDURE — 88305 TISSUE EXAM BY PATHOLOGIST: CPT | Performed by: PATHOLOGY

## 2024-11-14 PROCEDURE — 88305 TISSUE EXAM BY PATHOLOGIST: CPT | Mod: TC,ELYLAB | Performed by: INTERNAL MEDICINE

## 2024-11-14 RX ORDER — PROPOFOL 10 MG/ML
INJECTION, EMULSION INTRAVENOUS AS NEEDED
Status: DISCONTINUED | OUTPATIENT
Start: 2024-11-14 | End: 2024-11-14

## 2024-11-14 RX ORDER — LIDOCAINE HYDROCHLORIDE 20 MG/ML
INJECTION, SOLUTION INFILTRATION; PERINEURAL AS NEEDED
Status: DISCONTINUED | OUTPATIENT
Start: 2024-11-14 | End: 2024-11-14

## 2024-11-14 RX ORDER — SODIUM CHLORIDE 9 MG/ML
INJECTION, SOLUTION INTRAVENOUS CONTINUOUS PRN
Status: DISCONTINUED | OUTPATIENT
Start: 2024-11-14 | End: 2024-11-14

## 2024-11-14 SDOH — HEALTH STABILITY: MENTAL HEALTH: CURRENT SMOKER: 0

## 2024-11-14 ASSESSMENT — COLUMBIA-SUICIDE SEVERITY RATING SCALE - C-SSRS
2. HAVE YOU ACTUALLY HAD ANY THOUGHTS OF KILLING YOURSELF?: NO
1. IN THE PAST MONTH, HAVE YOU WISHED YOU WERE DEAD OR WISHED YOU COULD GO TO SLEEP AND NOT WAKE UP?: NO

## 2024-11-14 ASSESSMENT — PAIN SCALES - GENERAL
PAINLEVEL_OUTOF10: 0 - NO PAIN
PAIN_LEVEL: 0
PAINLEVEL_OUTOF10: 0 - NO PAIN

## 2024-11-14 ASSESSMENT — PAIN - FUNCTIONAL ASSESSMENT
PAIN_FUNCTIONAL_ASSESSMENT: 0-10

## 2024-11-14 NOTE — H&P
Outpatient [facility] Procedure H&P    Patient Profile  Name Satinder Silver  Date of Birth 1960  MRN [id number]  PCP Sukh Bhandari        Diagnosis: screening colon  Procedure(s):  Colonoscopy       Allergies  Allergies   Allergen Reactions    Nsaids (Non-Steroidal Anti-Inflammatory Drug) Other     AVOID IN CIRRHOSIS    Aspirin Angioedema       Past Medical History   Past Medical History:   Diagnosis Date    Anemia     Arthritis     Cirrhosis (Multi)     COVID-19     VACCINATED    Diverticulosis     Esophageal varices with bleeding (Multi)     GERD (gastroesophageal reflux disease)     History of ETOH abuse     Hypertension 09/29/2023    states he is not taking medications currently    Skin disorder        Medication Reviewed - yes  Prior to Admission medications    Medication Sig Start Date End Date Taking? Authorizing Provider   acetaminophen (Tylenol) 500 mg tablet Take 1 tablet (500 mg) by mouth every 6 hours if needed for mild pain (1 - 3).   Yes Historical Provider, MD   ketoconazole (NIZOral) 2 % cream Apply 1 Application topically 2 times a day. 8/16/24  Yes Sukh Bhandari, DO   omeprazole (PriLOSEC) 40 mg DR capsule Take 1 capsule (40 mg) by mouth once daily. Do not crush or chew. 8/9/24  Yes Hossein Avila, DO   ALPRAZolam (Xanax) 0.25 mg tablet Take 1 tablet (0.25 mg) by mouth once daily as needed for anxiety for up to 7 days.  Patient not taking: Reported on 11/14/2024 5/16/24 5/23/24  Sukh Bhandari DO       Physical Exam  Vitals:    11/14/24 1026   BP: 139/72   Pulse: 84   Resp: 13   Temp: 36 °C (96.8 °F)   SpO2: 99%      Weight   Vitals:    11/14/24 1026   Weight: 90.7 kg (200 lb)     BMI Body mass index is 31.32 kg/m².    General: A&Ox3, NAD.  HEENT: AT/NC.   CV: RRR. No murmur.  Resp: CTA bilaterally. No wheezing, rhonchi or rales.   GI: Soft, NT/ND. BSx4.  Extrem: No edema. Pulses intact.  Skin: No Jaundice.   Neuro: No focal deficits.   Psych: Normal mood and affect.        Oropharyngeal Classification  I (soft palate, uvula, fauces, and tonsillar pillars visible)  ASA PS Classification 3  Sedation Plan: Deep Sedation.  Procedure Plan - pre-procedural (re)assesment completed by physician:  discharge/transfer patient when discharge criteria met    Meño Breaux MD  11/14/2024 10:42 AM

## 2024-11-14 NOTE — ANESTHESIA PREPROCEDURE EVALUATION
Patient: Satinder Silver    Procedure Information       Date/Time: 11/14/24 1030    Scheduled providers: Meño Breaux MD    Procedure: COLONOSCOPY    Location: [facility]            Relevant Problems   Cardiac   (+) Hypertension      GI   (+) Esophageal varices with bleeding in diseases classified elsewhere (Multi)   (+) Gastrointestinal hemorrhage with hematemesis      Liver   (+) Liver disease, chronic, with cirrhosis (Multi)      Endocrine   (+) Class 1 obesity due to excess calories with body mass index (BMI) of 30.0 to 30.9 in adult      Hematology   (+) Symptomatic anemia       Clinical information reviewed:   Tobacco  Allergies  Meds  Problems  Med Hx  Surg Hx   Fam Hx  Soc   Hx        NPO Detail:  NPO/Void Status  Carbohydrate Drink Given Prior to Surgery? : N  Date of Last Liquid: 11/14/24  Time of Last Liquid: 0545  Date of Last Solid: 11/13/24  Time of Last Solid: 0600  Last Intake Type: Clear fluids  Time of Last Void: 1036         Physical Exam    Airway  Mallampati: II  TM distance: >3 FB  Neck ROM: full     Cardiovascular - normal exam  Rhythm: regular  Rate: normal     Dental - normal exam     Pulmonary - normal exam  Breath sounds clear to auscultation     Abdominal - normal exam         Anesthesia Plan    History of general anesthesia?: yes  History of complications of general anesthesia?: no    ASA 3     MAC     The patient is not a current smoker.    intravenous induction   Anesthetic plan and risks discussed with patient.    Plan discussed with CRNA.

## 2024-11-14 NOTE — DISCHARGE INSTRUCTIONS

## 2024-11-21 ENCOUNTER — HOSPITAL ENCOUNTER (OUTPATIENT)
Dept: RADIOLOGY | Facility: HOSPITAL | Age: 64
Discharge: HOME | End: 2024-11-21
Payer: OTHER GOVERNMENT

## 2024-11-21 ENCOUNTER — TELEPHONE (OUTPATIENT)
Dept: GASTROENTEROLOGY | Facility: EXTERNAL LOCATION | Age: 64
End: 2024-11-21
Payer: OTHER GOVERNMENT

## 2024-11-21 DIAGNOSIS — K76.9 LIVER DISEASE, CHRONIC, WITH CIRRHOSIS (MULTI): ICD-10-CM

## 2024-11-21 DIAGNOSIS — I85.11 ESOPHAGEAL VARICES WITH BLEEDING IN DISEASES CLASSIFIED ELSEWHERE (MULTI): ICD-10-CM

## 2024-11-21 DIAGNOSIS — K74.60 LIVER DISEASE, CHRONIC, WITH CIRRHOSIS (MULTI): ICD-10-CM

## 2024-11-21 LAB
LABORATORY COMMENT REPORT: NORMAL
PATH REPORT.FINAL DX SPEC: NORMAL
PATH REPORT.GROSS SPEC: NORMAL
PATH REPORT.RELEVANT HX SPEC: NORMAL
PATH REPORT.TOTAL CANCER: NORMAL

## 2024-11-21 PROCEDURE — 76705 ECHO EXAM OF ABDOMEN: CPT

## 2024-11-21 PROCEDURE — 76705 ECHO EXAM OF ABDOMEN: CPT | Performed by: STUDENT IN AN ORGANIZED HEALTH CARE EDUCATION/TRAINING PROGRAM

## 2024-11-25 ENCOUNTER — OFFICE VISIT (OUTPATIENT)
Dept: GASTROENTEROLOGY | Facility: CLINIC | Age: 64
End: 2024-11-25
Payer: OTHER GOVERNMENT

## 2024-11-25 VITALS
HEIGHT: 67 IN | BODY MASS INDEX: 31.23 KG/M2 | TEMPERATURE: 98.1 F | DIASTOLIC BLOOD PRESSURE: 74 MMHG | SYSTOLIC BLOOD PRESSURE: 121 MMHG | WEIGHT: 199 LBS | HEART RATE: 94 BPM

## 2024-11-25 DIAGNOSIS — K74.60 CIRRHOSIS OF LIVER WITHOUT ASCITES, UNSPECIFIED HEPATIC CIRRHOSIS TYPE (MULTI): Primary | ICD-10-CM

## 2024-11-25 PROCEDURE — 3078F DIAST BP <80 MM HG: CPT | Performed by: INTERNAL MEDICINE

## 2024-11-25 PROCEDURE — 3074F SYST BP LT 130 MM HG: CPT | Performed by: INTERNAL MEDICINE

## 2024-11-25 PROCEDURE — 99214 OFFICE O/P EST MOD 30 MIN: CPT | Performed by: INTERNAL MEDICINE

## 2024-11-25 PROCEDURE — 3008F BODY MASS INDEX DOCD: CPT | Performed by: INTERNAL MEDICINE

## 2024-11-25 NOTE — PATIENT INSTRUCTIONS
If you have any questions or need assistance, please don't hesitate to contact us.     Our offices are located at [facility].  Please use the numbers below when calling.   Dr. Sapp:         Office [phone] Fax: [phone]  Hepatology Nurse Coordinator:         Luz BRADEN [phone]     Main Scheduling Number: [phone]   (See below for department name when scheduling)      [x] LABS (Can be done at any  Location)  Due : Today and May  Type : LIVER CANCER SCREENING: Patients with advanced fibrosis or cirrhosis are at increased risk for liver cancer.  Successful treatment of liver cancer depends on early detection.  You should have regular interval screening for liver cancer every 6 months.  A blood test called Alpha-fetoprotein (AFP) and ultrasound of the liver is an important part of liver cancer screening to detect tumors.  You should also expect to have a follow up appointment with your liver doctor every 6 months    [x] IMAGING (Department Radiology) Due : May  Type : ULTRASOUND    [x] FOLLOW UP  (Department Gastro) Due : May  Type : SCHEDULING: PLEASE STOP AT THE  TO SCHEDULE  or  SCHEDULING: CALL TO SCHEDULE

## 2024-11-26 ENCOUNTER — APPOINTMENT (OUTPATIENT)
Dept: LAB | Facility: LAB | Age: 64
End: 2024-11-26
Payer: OTHER GOVERNMENT

## 2024-11-26 ENCOUNTER — LAB (OUTPATIENT)
Dept: LAB | Facility: LAB | Age: 64
End: 2024-11-26
Payer: OTHER GOVERNMENT

## 2024-11-26 DIAGNOSIS — K74.60 CIRRHOSIS OF LIVER WITHOUT ASCITES, UNSPECIFIED HEPATIC CIRRHOSIS TYPE (MULTI): ICD-10-CM

## 2024-11-26 LAB
AFP SERPL-MCNC: 4 NG/ML (ref 0–9)
ALBUMIN SERPL BCP-MCNC: 3.8 G/DL (ref 3.4–5)
ALP SERPL-CCNC: 83 U/L (ref 33–136)
ALT SERPL W P-5'-P-CCNC: 23 U/L (ref 10–52)
ANION GAP SERPL CALC-SCNC: 13 MMOL/L (ref 10–20)
AST SERPL W P-5'-P-CCNC: 31 U/L (ref 9–39)
BILIRUB DIRECT SERPL-MCNC: 0.4 MG/DL (ref 0–0.3)
BILIRUB SERPL-MCNC: 1.6 MG/DL (ref 0–1.2)
BUN SERPL-MCNC: 11 MG/DL (ref 6–23)
CALCIUM SERPL-MCNC: 8.9 MG/DL (ref 8.6–10.3)
CHLORIDE SERPL-SCNC: 103 MMOL/L (ref 98–107)
CO2 SERPL-SCNC: 26 MMOL/L (ref 21–32)
CREAT SERPL-MCNC: 0.82 MG/DL (ref 0.5–1.3)
EGFRCR SERPLBLD CKD-EPI 2021: >90 ML/MIN/1.73M*2
GLUCOSE SERPL-MCNC: 126 MG/DL (ref 74–99)
INR PPP: 1.2 (ref 0.9–1.1)
POTASSIUM SERPL-SCNC: 3.9 MMOL/L (ref 3.5–5.3)
PROT SERPL-MCNC: 6.6 G/DL (ref 6.4–8.2)
PROTHROMBIN TIME: 13.8 SECONDS (ref 9.8–12.8)
SODIUM SERPL-SCNC: 138 MMOL/L (ref 136–145)

## 2024-11-26 PROCEDURE — 36415 COLL VENOUS BLD VENIPUNCTURE: CPT

## 2024-11-26 PROCEDURE — 82105 ALPHA-FETOPROTEIN SERUM: CPT

## 2024-11-26 PROCEDURE — 80053 COMPREHEN METABOLIC PANEL: CPT

## 2024-11-26 PROCEDURE — 85610 PROTHROMBIN TIME: CPT

## 2024-11-26 PROCEDURE — 82248 BILIRUBIN DIRECT: CPT

## 2024-11-26 ASSESSMENT — ENCOUNTER SYMPTOMS
TROUBLE SWALLOWING: 0
HEADACHES: 0
CHILLS: 0
CONSTIPATION: 0
WHEEZING: 0
ABDOMINAL DISTENTION: 0
LIGHT-HEADEDNESS: 0
DIZZINESS: 0
COUGH: 0
ABDOMINAL PAIN: 0
COLOR CHANGE: 0
NAUSEA: 0
FEVER: 0
CONFUSION: 0
VOMITING: 0
ARTHRALGIAS: 0
SHORTNESS OF BREATH: 0
SLEEP DISTURBANCE: 0
UNEXPECTED WEIGHT CHANGE: 0
DIARRHEA: 0
DIFFICULTY URINATING: 0
SPEECH DIFFICULTY: 0
JOINT SWELLING: 0

## 2024-11-26 NOTE — PROGRESS NOTES
Subjective  He remains sober and has plans for a new house he is building and marital bliss. He denies fluid overload or cognitive impairment.     Review of Systems   Constitutional:  Negative for chills, fever and unexpected weight change.   HENT:  Negative for congestion and trouble swallowing.    Respiratory:  Negative for cough, shortness of breath and wheezing.    Cardiovascular:  Negative for chest pain.   Gastrointestinal:  Negative for abdominal distention, abdominal pain, constipation, diarrhea, nausea and vomiting.   Genitourinary:  Negative for difficulty urinating.   Musculoskeletal:  Negative for arthralgias and joint swelling.   Skin:  Negative for color change.   Neurological:  Negative for dizziness, speech difficulty, light-headedness and headaches.   Psychiatric/Behavioral:  Negative for confusion and sleep disturbance.        Physical Exam  Constitutional:       General: He is awake.      Appearance: Normal appearance.   HENT:      Head: Normocephalic and atraumatic.      Nose: Nose normal.      Mouth/Throat:      Mouth: Mucous membranes are moist.   Eyes:      Pupils: Pupils are equal, round, and reactive to light.   Neck:      Thyroid: No thyroid mass.      Trachea: Phonation normal.   Cardiovascular:      Rate and Rhythm: Normal rate and regular rhythm.      Heart sounds: Normal heart sounds. No murmur heard.     No gallop.   Pulmonary:      Effort: Pulmonary effort is normal. No respiratory distress.      Breath sounds: Normal air entry. No decreased breath sounds, wheezing, rhonchi or rales.   Abdominal:      General: Bowel sounds are normal. There is no distension.      Palpations: Abdomen is soft.      Tenderness: There is no abdominal tenderness.   Musculoskeletal:      Cervical back: Neck supple.      Right lower leg: No edema.      Left lower leg: No edema.   Skin:     General: Skin is warm.      Capillary Refill: Capillary refill takes less than 2 seconds.   Neurological:      General:  No focal deficit present.      Mental Status: He is alert and oriented to person, place, and time. Mental status is at baseline.      Cranial Nerves: Cranial nerves 2-12 are intact.      Motor: Motor function is intact.   Psychiatric:         Attention and Perception: Attention and perception normal.         Mood and Affect: Mood normal.         Speech: Speech normal.         Behavior: Behavior normal.     LABS:   Lab Results   Component Value Date     05/21/2024    CREATININE 0.76 05/21/2024    BILITOT 1.9 (H) 05/21/2024    INR 1.2 (H) 05/21/2024     Lab Results   Component Value Date    AFP 5 05/21/2024      Lab Results   Component Value Date    ALBUMIN 3.6 05/21/2024    BILITOT 1.9 (H) 05/21/2024    BILIDIR 0.5 (H) 05/21/2024    ALKPHOS 79 05/21/2024    ALT 20 05/21/2024    AST 37 05/21/2024    PROT 6.4 05/21/2024      Lab Results   Component Value Date    WBC 5.4 08/06/2024    HGB 13.0 (L) 08/06/2024    HCT 37.9 (L) 08/06/2024    MCV 98 08/06/2024    PLT 66 (L) 08/06/2024     Lab Results   Component Value Date    GLUCOSE 84 05/21/2024    CALCIUM 8.9 05/21/2024     05/21/2024    K 4.2 05/21/2024    CO2 26 05/21/2024     05/21/2024    BUN 15 05/21/2024    CREATININE 0.76 05/21/2024     Lab Results   Component Value Date    INR 1.2 (H) 05/21/2024        === 11/21/24 ===    US ABDOMEN LIMITED LIVER    - Impression -  1. Morphological changes of cirrhosis. Few indeterminate tiny left  hepatic lobe echogenic foci measuring up to 0.3 cm. Otherwise no  gross suspicious lesions. For continued surveillance.    MACRO:  None    Signed by: Jean Baptiste 11/22/2024 5:51 PM  Dictation workstation:   [id number]     Cirrhosis of liver without ascites (Multi)  This patient has cirrhosis and we discussed natural history. we emphasized the importance of health maintenance interventions to prevent complications of liver disease including  a) lifelong abstinence  b) MELD/US/AFP every 6 months to assess liver  function and survey for HCC  c) follow up endoscopies  with his referring GI  d) watch out for symptoms of decompensated liver disease including cognitive impairment, excessive somnolence, lower extremity edema and increase in abdominal girth  e) follow up in the office every 6 months

## 2024-11-26 NOTE — ASSESSMENT & PLAN NOTE
This patient has cirrhosis and we discussed natural history. we emphasized the importance of health maintenance interventions to prevent complications of liver disease including  a) lifelong abstinence  b) MELD/US/AFP every 6 months to assess liver function and survey for HCC  c) follow up endoscopies  with his referring GI  d) watch out for symptoms of decompensated liver disease including cognitive impairment, excessive somnolence, lower extremity edema and increase in abdominal girth  e) follow up in the office every 6 months

## 2025-02-05 DIAGNOSIS — K20.90 ESOPHAGITIS: ICD-10-CM

## 2025-02-05 DIAGNOSIS — K21.9 GASTRO-ESOPHAGEAL REFLUX DISEASE WITHOUT ESOPHAGITIS: ICD-10-CM

## 2025-02-05 RX ORDER — OMEPRAZOLE 40 MG/1
40 CAPSULE, DELAYED RELEASE ORAL DAILY
Qty: 90 CAPSULE | Refills: 1 | Status: SHIPPED | OUTPATIENT
Start: 2025-02-05

## 2025-02-12 ENCOUNTER — TELEPHONE (OUTPATIENT)
Dept: PRIMARY CARE | Facility: CLINIC | Age: 65
End: 2025-02-12
Payer: OTHER GOVERNMENT

## 2025-02-12 DIAGNOSIS — Z12.5 SCREENING FOR PROSTATE CANCER: Primary | ICD-10-CM

## 2025-02-12 DIAGNOSIS — I10 HYPERTENSION, UNSPECIFIED TYPE: ICD-10-CM

## 2025-02-20 LAB
ALBUMIN SERPL-MCNC: 4.2 G/DL (ref 3.6–5.1)
ALP SERPL-CCNC: 80 U/L (ref 35–144)
ALT SERPL-CCNC: 21 U/L (ref 9–46)
ANION GAP SERPL CALCULATED.4IONS-SCNC: 9 MMOL/L (CALC) (ref 7–17)
AST SERPL-CCNC: 28 U/L (ref 10–35)
BASOPHILS # BLD AUTO: 32 CELLS/UL (ref 0–200)
BASOPHILS NFR BLD AUTO: 0.9 %
BILIRUB SERPL-MCNC: 2.3 MG/DL (ref 0.2–1.2)
BUN SERPL-MCNC: 13 MG/DL (ref 7–25)
CALCIUM SERPL-MCNC: 9.2 MG/DL (ref 8.6–10.3)
CHLORIDE SERPL-SCNC: 104 MMOL/L (ref 98–110)
CHOLEST SERPL-MCNC: 179 MG/DL
CHOLEST/HDLC SERPL: 3.1 (CALC)
CO2 SERPL-SCNC: 27 MMOL/L (ref 20–32)
CREAT SERPL-MCNC: 0.76 MG/DL (ref 0.7–1.35)
EGFRCR SERPLBLD CKD-EPI 2021: 101 ML/MIN/1.73M2
EOSINOPHIL # BLD AUTO: 151 CELLS/UL (ref 15–500)
EOSINOPHIL NFR BLD AUTO: 4.3 %
ERYTHROCYTE [DISTWIDTH] IN BLOOD BY AUTOMATED COUNT: 12.9 % (ref 11–15)
GLUCOSE SERPL-MCNC: 87 MG/DL (ref 65–99)
HCT VFR BLD AUTO: 44.5 % (ref 38.5–50)
HDLC SERPL-MCNC: 57 MG/DL
HGB BLD-MCNC: 15.3 G/DL (ref 13.2–17.1)
LDLC SERPL CALC-MCNC: 109 MG/DL (CALC)
LYMPHOCYTES # BLD AUTO: 910 CELLS/UL (ref 850–3900)
LYMPHOCYTES NFR BLD AUTO: 26 %
MCH RBC QN AUTO: 33.2 PG (ref 27–33)
MCHC RBC AUTO-ENTMCNC: 34.4 G/DL (ref 32–36)
MCV RBC AUTO: 96.5 FL (ref 80–100)
MONOCYTES # BLD AUTO: 375 CELLS/UL (ref 200–950)
MONOCYTES NFR BLD AUTO: 10.7 %
NEUTROPHILS # BLD AUTO: 2034 CELLS/UL (ref 1500–7800)
NEUTROPHILS NFR BLD AUTO: 58.1 %
NONHDLC SERPL-MCNC: 122 MG/DL (CALC)
PLATELET # BLD AUTO: 52 THOUSAND/UL (ref 140–400)
PMV BLD REES-ECKER: 10.9 FL (ref 7.5–12.5)
POTASSIUM SERPL-SCNC: 4.2 MMOL/L (ref 3.5–5.3)
PROT SERPL-MCNC: 7.1 G/DL (ref 6.1–8.1)
PSA SERPL-MCNC: 0.72 NG/ML
RBC # BLD AUTO: 4.61 MILLION/UL (ref 4.2–5.8)
SERVICE CMNT-IMP: ABNORMAL
SODIUM SERPL-SCNC: 140 MMOL/L (ref 135–146)
TRIGL SERPL-MCNC: 51 MG/DL
TSH SERPL-ACNC: 1.54 MIU/L (ref 0.4–4.5)
VIT B12 SERPL-MCNC: 344 PG/ML (ref 200–1100)
WBC # BLD AUTO: 3.5 THOUSAND/UL (ref 3.8–10.8)

## 2025-02-24 ENCOUNTER — APPOINTMENT (OUTPATIENT)
Dept: PRIMARY CARE | Facility: CLINIC | Age: 65
End: 2025-02-24
Payer: OTHER GOVERNMENT

## 2025-02-24 VITALS
BODY MASS INDEX: 29.76 KG/M2 | TEMPERATURE: 97.8 F | OXYGEN SATURATION: 96 % | DIASTOLIC BLOOD PRESSURE: 86 MMHG | WEIGHT: 190 LBS | SYSTOLIC BLOOD PRESSURE: 128 MMHG | HEART RATE: 90 BPM

## 2025-02-24 DIAGNOSIS — F41.9 ANXIETY: ICD-10-CM

## 2025-02-24 DIAGNOSIS — Z00.00 ROUTINE HEALTH MAINTENANCE: Primary | ICD-10-CM

## 2025-02-24 DIAGNOSIS — Z86.19 HISTORY OF JOCK ITCH: ICD-10-CM

## 2025-02-24 PROCEDURE — 99396 PREV VISIT EST AGE 40-64: CPT | Performed by: FAMILY MEDICINE

## 2025-02-24 PROCEDURE — 3074F SYST BP LT 130 MM HG: CPT | Performed by: FAMILY MEDICINE

## 2025-02-24 PROCEDURE — 3079F DIAST BP 80-89 MM HG: CPT | Performed by: FAMILY MEDICINE

## 2025-02-24 PROCEDURE — 1036F TOBACCO NON-USER: CPT | Performed by: FAMILY MEDICINE

## 2025-02-24 RX ORDER — KETOCONAZOLE 20 MG/G
1 CREAM TOPICAL 2 TIMES DAILY
Qty: 30 G | Refills: 3 | Status: SHIPPED | OUTPATIENT
Start: 2025-02-24

## 2025-02-24 RX ORDER — ESCITALOPRAM OXALATE 5 MG/1
5 TABLET ORAL DAILY
Qty: 30 TABLET | Refills: 3 | Status: SHIPPED | OUTPATIENT
Start: 2025-02-24

## 2025-02-24 SDOH — HEALTH STABILITY: PHYSICAL HEALTH: ON AVERAGE, HOW MANY MINUTES DO YOU ENGAGE IN EXERCISE AT THIS LEVEL?: 20 MIN

## 2025-02-24 SDOH — HEALTH STABILITY: PHYSICAL HEALTH: ON AVERAGE, HOW MANY DAYS PER WEEK DO YOU ENGAGE IN MODERATE TO STRENUOUS EXERCISE (LIKE A BRISK WALK)?: 2 DAYS

## 2025-02-24 ASSESSMENT — PATIENT HEALTH QUESTIONNAIRE - PHQ9
SUM OF ALL RESPONSES TO PHQ9 QUESTIONS 1 & 2: 0
2. FEELING DOWN, DEPRESSED OR HOPELESS: NOT AT ALL
1. LITTLE INTEREST OR PLEASURE IN DOING THINGS: NOT AT ALL

## 2025-02-24 ASSESSMENT — ENCOUNTER SYMPTOMS
FEVER: 0
NERVOUS/ANXIOUS: 1
SLEEP DISTURBANCE: 0
COUGH: 0

## 2025-02-24 NOTE — PROGRESS NOTES
Subjective   Patient ID: Satinder Silver is a 64 y.o. male who presents for Annual Exam (PHQ2- negative ).    HPI       He is here today for 6-month follow-up and annual physical  He follows with hepatology and gastroenterology for cirrhosis and history of esophageal varices.  He was previously hospitalized January 2024 with anemia (hemoglobin as low as 4.1, received a total of 5 units PRBC).  During that stay he had a CT scan which showed a cirrhotic appearing liver and also had an EGD which showed esophageal varices which were banded  He has been following with hepatology every 6 months.  He has been abstaining from alcohol since his hospitalization.  He is feeling well denies any abdominal pain/swelling, leg edema or dyspnea  Most recent EGD was done in August.  A repeat EGD in 1 year was recommended.  He is currently taking omeprazole    He has a history of hypertension which is currently untreated.  He was previously on lisinopril.  He checks his blood pressure out of the office and it typically is 117/80  Last colonoscopy was done 2024.  Repeat 5 years recommended  Last Tdap vaccine was done in 2024.  He has received the first but not the second shingles vaccine  He would like to discuss anxiety.  He has had anxiety present for most of his life.  Has anxiety symptoms which occur most days.  Admits to worrying, mind racing and overthinking things.  No depression symptoms or difficulty sleeping.  He especially gets anxious in social settings.      Patient Health Questionnaire-2 Score: 0 (2/24/2025  8:56 AM)      Review of Systems   Constitutional:  Negative for fever.   Respiratory:  Negative for cough.    Cardiovascular:  Negative for chest pain.   Psychiatric/Behavioral:  Negative for sleep disturbance. The patient is nervous/anxious.        Objective   /86   Pulse 90   Temp 36.6 °C (97.8 °F) (Temporal)   Wt 86.2 kg (190 lb)   SpO2 96%   BMI 29.76 kg/m²     Physical Exam  Vitals reviewed.    Constitutional:       General: He is not in acute distress.     Appearance: Normal appearance. He is well-developed.   HENT:      Head: Normocephalic.      Right Ear: Tympanic membrane, ear canal and external ear normal.      Left Ear: Tympanic membrane, ear canal and external ear normal.      Nose: Nose normal.      Mouth/Throat:      Mouth: Mucous membranes are moist.   Eyes:      Conjunctiva/sclera: Conjunctivae normal.   Neck:      Thyroid: No thyromegaly.      Vascular: No JVD.   Cardiovascular:      Rate and Rhythm: Normal rate and regular rhythm.      Heart sounds: Normal heart sounds.   Pulmonary:      Effort: Pulmonary effort is normal.      Breath sounds: Normal breath sounds.   Abdominal:      Palpations: Abdomen is soft.      Tenderness: There is no abdominal tenderness.   Musculoskeletal:      Right lower leg: No edema.      Left lower leg: No edema.   Lymphadenopathy:      Cervical: No cervical adenopathy.   Skin:     Findings: No rash.   Neurological:      Mental Status: He is alert and oriented to person, place, and time.   Psychiatric:         Mood and Affect: Mood normal.         Behavior: Behavior normal.         Assessment/Plan   Assessment & Plan  Routine health maintenance         Anxiety    Orders:    escitalopram (Lexapro) 5 mg tablet; Take 1 tablet (5 mg) by mouth once daily.    History of jock itch    Orders:    ketoconazole (NIZOral) 2 % cream; Apply 1 Application topically 2 times a day.    Up-to-date on colon cancer and prostate cancer screening  He is up-to-date on tetanus.  I did discuss vaccinations including having him receive the hepatitis B vaccine series and second shingles vaccine.  He is going to consider this and we will discuss further at his next appointment  We reviewed his labs from 2/19/2025.  LDL was slightly suboptimal at 109.  Briefly discussed avoiding saturated fats in diet and we will check this again in 1 year  His most recent CBC showed that his hemoglobin has  improved to normal range at 15.3.  His platelets were low at 52 which is likely due to his history of cirrhosis.  He is going to be having a CBC rechecked in May with hepatology  Anxiety: We will start Lexapro 5 mg daily. I did contact his hepatologist to confirm that it would be okay to start an SSRI.  Follow-up in 1 month for recheck

## 2025-03-25 ENCOUNTER — APPOINTMENT (OUTPATIENT)
Dept: PRIMARY CARE | Facility: CLINIC | Age: 65
End: 2025-03-25
Payer: OTHER GOVERNMENT

## 2025-03-25 VITALS
HEART RATE: 90 BPM | SYSTOLIC BLOOD PRESSURE: 144 MMHG | TEMPERATURE: 97.6 F | DIASTOLIC BLOOD PRESSURE: 72 MMHG | BODY MASS INDEX: 28.98 KG/M2 | WEIGHT: 185 LBS | OXYGEN SATURATION: 97 %

## 2025-03-25 DIAGNOSIS — F41.9 ANXIETY: Primary | ICD-10-CM

## 2025-03-25 PROCEDURE — G2211 COMPLEX E/M VISIT ADD ON: HCPCS | Performed by: FAMILY MEDICINE

## 2025-03-25 PROCEDURE — 3078F DIAST BP <80 MM HG: CPT | Performed by: FAMILY MEDICINE

## 2025-03-25 PROCEDURE — 3077F SYST BP >= 140 MM HG: CPT | Performed by: FAMILY MEDICINE

## 2025-03-25 PROCEDURE — 99213 OFFICE O/P EST LOW 20 MIN: CPT | Performed by: FAMILY MEDICINE

## 2025-03-25 PROCEDURE — 1036F TOBACCO NON-USER: CPT | Performed by: FAMILY MEDICINE

## 2025-03-25 ASSESSMENT — PATIENT HEALTH QUESTIONNAIRE - PHQ9
2. FEELING DOWN, DEPRESSED OR HOPELESS: NOT AT ALL
1. LITTLE INTEREST OR PLEASURE IN DOING THINGS: NOT AT ALL
SUM OF ALL RESPONSES TO PHQ9 QUESTIONS 1 & 2: 0

## 2025-03-25 NOTE — PROGRESS NOTES
Subjective   Patient ID: Satinder Silver is a 64 y.o. male who presents for Follow-up.    HPI     He is here today to follow-up on anxiety  He is currently taking Lexapro 5 mg daily.  We started him on this medication at his last visit 1 month ago  At that time he was having anxiety symptoms which had been present most of his life.  This included worrying, overthinking things, mind racing, as well as getting anxious in social settings.  Anxiety symptoms were occurring most days  He feels that the Lexapro has been helping with his symptoms.  He does not worry as much about things and has been overthinking less and having less mind racing.  He is also feeling less anxious around other people  No depression symptoms or difficulty sleeping.  No significant adverse effects.  He did notice some ED symptoms at onset, but tells me that this has gotten better  He estimates that his anxiety has improved by approximately 80% since starting Lexapro          Review of Systems    Objective   /72   Pulse 90   Temp 36.4 °C (97.6 °F) (Temporal)   Wt 83.9 kg (185 lb)   SpO2 97%   BMI 28.98 kg/m²     Physical Exam  Vitals reviewed.   Constitutional:       General: He is not in acute distress.     Appearance: Normal appearance. He is well-developed.   HENT:      Head: Normocephalic.   Eyes:      Conjunctiva/sclera: Conjunctivae normal.   Cardiovascular:      Rate and Rhythm: Normal rate and regular rhythm.      Heart sounds: Normal heart sounds.   Pulmonary:      Effort: Pulmonary effort is normal.      Breath sounds: Normal breath sounds.   Skin:     Findings: No rash.   Neurological:      Mental Status: He is alert.   Psychiatric:         Mood and Affect: Mood normal.         Behavior: Behavior normal.         Assessment/Plan   Assessment & Plan  Anxiety         Symptoms have been improving since starting Lexapro, and he feels that his anxiety has gotten better by approximately 80% since starting.  We will continue Lexapro 5  mg daily and follow-up in 6 months for recheck  His blood pressure today was elevated at 144/72.  He has a blood pressure monitor at home, and I recommended that he start monitoring, and send us a message with an update on his home blood pressure readings in approximately 1 month

## 2025-09-25 ENCOUNTER — APPOINTMENT (OUTPATIENT)
Dept: PRIMARY CARE | Facility: CLINIC | Age: 65
End: 2025-09-25
Payer: OTHER GOVERNMENT